# Patient Record
Sex: FEMALE | Race: ASIAN | NOT HISPANIC OR LATINO
[De-identification: names, ages, dates, MRNs, and addresses within clinical notes are randomized per-mention and may not be internally consistent; named-entity substitution may affect disease eponyms.]

---

## 2017-10-18 ENCOUNTER — FORM ENCOUNTER (OUTPATIENT)
Age: 47
End: 2017-10-18

## 2018-10-17 ENCOUNTER — FORM ENCOUNTER (OUTPATIENT)
Age: 48
End: 2018-10-17

## 2018-10-23 ENCOUNTER — FORM ENCOUNTER (OUTPATIENT)
Age: 48
End: 2018-10-23

## 2019-04-09 ENCOUNTER — FORM ENCOUNTER (OUTPATIENT)
Age: 49
End: 2019-04-09

## 2019-10-23 ENCOUNTER — FORM ENCOUNTER (OUTPATIENT)
Age: 49
End: 2019-10-23

## 2019-10-28 ENCOUNTER — FORM ENCOUNTER (OUTPATIENT)
Age: 49
End: 2019-10-28

## 2020-09-30 ENCOUNTER — FORM ENCOUNTER (OUTPATIENT)
Age: 50
End: 2020-09-30

## 2020-12-21 ENCOUNTER — NON-APPOINTMENT (OUTPATIENT)
Age: 50
End: 2020-12-21

## 2020-12-21 PROBLEM — Z00.00 ENCOUNTER FOR PREVENTIVE HEALTH EXAMINATION: Status: ACTIVE | Noted: 2020-12-21

## 2020-12-22 DIAGNOSIS — Z78.9 OTHER SPECIFIED HEALTH STATUS: ICD-10-CM

## 2020-12-22 DIAGNOSIS — Z86.79 PERSONAL HISTORY OF OTHER DISEASES OF THE CIRCULATORY SYSTEM: ICD-10-CM

## 2021-01-04 ENCOUNTER — APPOINTMENT (OUTPATIENT)
Dept: BREAST CENTER | Facility: CLINIC | Age: 51
End: 2021-01-04

## 2021-01-11 ENCOUNTER — FORM ENCOUNTER (OUTPATIENT)
Age: 51
End: 2021-01-11

## 2021-01-12 ENCOUNTER — APPOINTMENT (OUTPATIENT)
Dept: BREAST CENTER | Facility: CLINIC | Age: 51
End: 2021-01-12
Payer: COMMERCIAL

## 2021-01-12 ENCOUNTER — APPOINTMENT (OUTPATIENT)
Dept: BREAST CENTER | Facility: CLINIC | Age: 51
End: 2021-01-12

## 2021-01-12 VITALS
BODY MASS INDEX: 19.84 KG/M2 | HEART RATE: 60 BPM | HEIGHT: 63 IN | SYSTOLIC BLOOD PRESSURE: 135 MMHG | WEIGHT: 112 LBS | DIASTOLIC BLOOD PRESSURE: 82 MMHG

## 2021-01-12 DIAGNOSIS — Z12.39 ENCOUNTER FOR OTHER SCREENING FOR MALIGNANT NEOPLASM OF BREAST: ICD-10-CM

## 2021-01-12 DIAGNOSIS — N63.0 UNSPECIFIED LUMP IN UNSPECIFIED BREAST: ICD-10-CM

## 2021-01-12 PROCEDURE — 99072 ADDL SUPL MATRL&STAF TM PHE: CPT

## 2021-01-12 PROCEDURE — 99213 OFFICE O/P EST LOW 20 MIN: CPT

## 2021-01-12 NOTE — PAST MEDICAL HISTORY
[Menarche Age ____] : age at menarche was [unfilled] [Total Preg ___] : G[unfilled] [Live Births ___] : P[unfilled]  [Age At Live Birth ___] : Age at live birth: [unfilled] [unknown] : the patient is unsure of the date of her LMP

## 2021-01-13 ENCOUNTER — TRANSCRIPTION ENCOUNTER (OUTPATIENT)
Age: 51
End: 2021-01-13

## 2021-01-14 ENCOUNTER — FORM ENCOUNTER (OUTPATIENT)
Age: 51
End: 2021-01-14

## 2021-01-15 NOTE — PHYSICAL EXAM
[Supple] : supple [No Supraclavicular Adenopathy] : no supraclavicular adenopathy [No Cervical Adenopathy] : no cervical adenopathy [Examined in the supine and seated position] : examined in the supine and seated position [No Nipple Retraction] : no left nipple retraction [No Nipple Discharge] : no left nipple discharge [No Axillary Lymphadenopathy] : no left axillary lymphadenopathy [de-identified] : dense breasts [de-identified] : thickening 7:00 with no suspicious findings on in-office US [de-identified] : UOQ nodularity

## 2021-01-15 NOTE — DATA REVIEWED
[FreeTextEntry1] : 10/19/17: Mark MG & US ([facility]): extremely dense, US – multiple stable circumscribed solid nodules c/w fibroadenomas including: R – 0.5cm 12:00 2FN, 0.7cm 12:30 2FN, 0.9cm 1:00 5FN, 0.5cm 7:00 3FN, 0.4cm 8:00 3FN, 0.9cm 9:00 2FN, 0.5cm 10:00 3FN, L – 0.7cm 12:00 3FN, 0.9cm 12:00 2FN, 0.5cm 1:00 4FN, 0.8cm 2:00 2FN, 0.9cm 3:00, 1cm 10:00 2FN, 0.4cm 10:30 1FN. BIRADS 2. \par 10/18/18: [facility] & US ([facility]): R – interval development of a 1cm irregular shaped mass 12:00 5FN assoc. w/ two adjacent coarse calcs confirmed on additional imaging, 0.9cm mass 2cm back from the nipple, L – 0.8cm mass 2:00 1-2cm back from the nipple, 0.5cm mass 12:00; US – stable mark solid masses c/w multiple fibroadenomata, R – 0.9cm solid mass 1:00 5FN c/w suspicious mass on MG – US bx rec. BIRADS 4. MRI rec d/t potential high risk status\par 10/18/18: R FNA: proliferative fibrocystic change\par 10/24/18: MRI ([facility]): heterogeneously dense fibroglandular tissue w/ mild background parenchymal enhancement characterized by few scattered foci of parenchymal enhancement, R – new 1cm enhancing mass w/ angular and indistinct margins UOQ likely c/w irregular mass on MG & US, L – 0.9cm oval enhancing mass w/ circumscribed margins UIQ 3-4FN decreased in size from prior MRI c/w fibroadenoma, 0.7cm oval circumscribed nonenhancing mass 3-4FN. BIRADS 4 US bx rec for R 1cm irregular mass. \par 10/24/19: Mark MG & US ([facility]): extremely dense, R – few stable benign calcs, US – multiple stable benign nodules mark. BIRADS 2.\par 10/29/19: MRI ([facility]): heterogeneously dense fibroglandular tissue w/ marked background parenchymal enhancemen, L – 0.7cm oval enhancing mass w/ circumscribed margins UIQ 4FN, R – no enhancing mass UIQ that c/w finding on prior study s/p benign bx. BIRADS 2.\par 10/1/20: Mark MG & US ([facility]): extremely dense, ANANYA, US – numerous mark benign nodules. BIRADS 2. \par 1/12/21: MR: heterogeneously dense fibroglandular tissue w/ mild background parenchymal enhancements, scattered foci of enhancement mark w/o dominant suspicious focus, L – 0.7cm stable enhancing mass w/ circumscribed borders upper inner quadrant likely fibroadenoma. BIRADS 2. \par

## 2021-01-15 NOTE — HISTORY OF PRESENT ILLNESS
[FreeTextEntry1] : 49yo female previously seen by Dr. De Paz with no personal hx of breast ca and FHx of breast ca mother 58, maternal aunt 56, and paternal great aunt 60 presents for breast cancer screening. Patient denies any palpable masses, skin changes, or nipple discharge bilaterally. Mark MG & US 10/1/20 BIRADS 2. 1/12/21 MR BIRADS 2.

## 2021-01-15 NOTE — ASSESSMENT
[FreeTextEntry1] : This is a 51yo female with FHx breast ca mother 54 and maternal aunt 56 presenting for breast cancer screening. Patient denies palpable masses, skin changes, or nipple discharge bilaterally. Physical examination notes bilateral breast density with nodularity in L UOQ and thickening in R at 7:00. Most recent mammogram and sonogram 10/1/20 BIRADS 2 and MRI from today WNL. Discussed with patient genetic predispostion to cancer and referred her to genetic counselor. She is to return in 6 months for re-examination and is to have screening mammo/sono in October 2021.

## 2021-01-28 ENCOUNTER — NON-APPOINTMENT (OUTPATIENT)
Age: 51
End: 2021-01-28

## 2021-07-13 ENCOUNTER — APPOINTMENT (OUTPATIENT)
Dept: BREAST CENTER | Facility: CLINIC | Age: 51
End: 2021-07-13
Payer: COMMERCIAL

## 2021-07-13 VITALS
SYSTOLIC BLOOD PRESSURE: 131 MMHG | BODY MASS INDEX: 19.84 KG/M2 | WEIGHT: 112 LBS | HEIGHT: 63 IN | HEART RATE: 60 BPM | DIASTOLIC BLOOD PRESSURE: 65 MMHG

## 2021-07-13 DIAGNOSIS — R92.2 INCONCLUSIVE MAMMOGRAM: ICD-10-CM

## 2021-07-13 DIAGNOSIS — N64.9 DISORDER OF BREAST, UNSPECIFIED: ICD-10-CM

## 2021-07-13 DIAGNOSIS — Z12.39 ENCOUNTER FOR OTHER SCREENING FOR MALIGNANT NEOPLASM OF BREAST: ICD-10-CM

## 2021-07-13 PROCEDURE — 99213 OFFICE O/P EST LOW 20 MIN: CPT

## 2021-07-13 PROCEDURE — 99072 ADDL SUPL MATRL&STAF TM PHE: CPT

## 2021-07-13 RX ORDER — SULFAMETHOXAZOLE AND TRIMETHOPRIM 800; 160 MG/1; MG/1
800-160 TABLET ORAL
Qty: 6 | Refills: 0 | Status: DISCONTINUED | COMMUNITY
Start: 2021-01-28

## 2021-07-13 NOTE — PAST MEDICAL HISTORY
[Menarche Age ____] : age at menarche was [unfilled] [Total Preg ___] : G[unfilled] [Live Births ___] : P[unfilled]  [Age At Live Birth ___] : Age at live birth: [unfilled] [Perimenopausal] : The patient is perimenopausal [Definite ___ (Date)] : the last menstrual period was [unfilled] [Irregular Cycle Intervals] : are  irregular

## 2021-07-17 NOTE — ASSESSMENT
[FreeTextEntry1] : 51yo female LOV 1/12/21 previously seen by Dr. De Paz with no personal hx of breast ca and FHx of breast ca mother 58, maternal aunt 56, and paternal great aunt 60 presents for breast cancer screening. Patient denies any palpable masses, skin changes, or nipple discharge bilaterally. Mark MG & US 10/1/20 BIRADS 2. 1/12/21 MR BIRADS 2. Patient due for mammo/sono in October and MRI in April. Patient to return in April after MRI for reexamination.

## 2021-07-17 NOTE — HISTORY OF PRESENT ILLNESS
[FreeTextEntry1] : 51yo female LOV 1/12/21 previously seen by Dr. De Paz with no personal hx of breast ca and FHx of breast ca mother 58, maternal aunt 56, and paternal great aunt 60 presents for breast cancer screening. Patient denies any palpable masses, skin changes, or nipple discharge bilaterally. Mark MG & US 10/1/20 BIRADS 2. 1/12/21 MR BIRADS 2. \par \par Genetic Testing January 2021 negative\par \par DAQUAN Banks Lifetime Risk Score 30.7%

## 2021-07-17 NOTE — PHYSICAL EXAM
[Supple] : supple [No Supraclavicular Adenopathy] : no supraclavicular adenopathy [No Cervical Adenopathy] : no cervical adenopathy [Examined in the supine and seated position] : examined in the supine and seated position [No Nipple Retraction] : no left nipple retraction [No Nipple Discharge] : no left nipple discharge [No Axillary Lymphadenopathy] : no left axillary lymphadenopathy [de-identified] : dense breasts [de-identified] : thickening 7:00  [de-identified] : UOQ nodularity

## 2021-10-12 ENCOUNTER — NON-APPOINTMENT (OUTPATIENT)
Age: 51
End: 2021-10-12

## 2022-04-04 ENCOUNTER — NON-APPOINTMENT (OUTPATIENT)
Age: 52
End: 2022-04-04

## 2022-04-06 ENCOUNTER — NON-APPOINTMENT (OUTPATIENT)
Age: 52
End: 2022-04-06

## 2022-05-03 ENCOUNTER — APPOINTMENT (OUTPATIENT)
Dept: BREAST CENTER | Facility: CLINIC | Age: 52
End: 2022-05-03
Payer: COMMERCIAL

## 2022-05-03 VITALS
HEIGHT: 63 IN | WEIGHT: 111 LBS | DIASTOLIC BLOOD PRESSURE: 74 MMHG | SYSTOLIC BLOOD PRESSURE: 133 MMHG | BODY MASS INDEX: 19.67 KG/M2 | HEART RATE: 53 BPM

## 2022-05-03 PROCEDURE — 99213 OFFICE O/P EST LOW 20 MIN: CPT

## 2022-05-03 RX ORDER — CHROMIUM 200 MCG
TABLET ORAL
Refills: 0 | Status: ACTIVE | COMMUNITY

## 2022-05-07 NOTE — PAST MEDICAL HISTORY
[Menarche Age ____] : age at menarche was [unfilled] [Definite ___ (Date)] : the last menstrual period was [unfilled] [Irregular Cycle Intervals] : are  irregular [Total Preg ___] : G[unfilled] [Live Births ___] : P[unfilled]  [Age At Live Birth ___] : Age at live birth: [unfilled] [Postmenopausal] : The patient is postmenopausal [Menopause Age____] : age at menopause was [unfilled] [History of Hormone Replacement Treatment] : has no history of hormone replacement treatment [FreeTextEntry6] : no [FreeTextEntry7] : no [FreeTextEntry8] : yes

## 2022-05-07 NOTE — ASSESSMENT
[FreeTextEntry1] : Patient is a 51 year female with a strong family history of breast cancer, lifetime DAQUAN risk of 34.3%. Discussed with the patient the implications for her lifetime risk, which is considered to be at high risk to develop breast cancer over the span of her lifetime. Patient undergoes high risk screening surveillance to include biannual radiological screening exams with a mammogram and screening MRI. Mark MG & US 10/1/21 BIRADS 2. 4/5/22 MR BIRADS 2. Reviewed risk reduction options from lifestyle, radiological surveillance, anti-estrogen maintenance, and prophylactic mastectomy. Will refer patient to med onc Dr. Diop to discuss anti-estrogen or aromatase inhibitor options and to plastic surgeon Dr. Lerman to discuss prophylactic mastectomy reconstruction options. Plan for patient to return in 6 weeks. Plan for screening mammogram/US in October 2022. All patient questions were answered; she verbalized understanding of the information and plan of care.\par

## 2022-05-07 NOTE — HISTORY OF PRESENT ILLNESS
[FreeTextEntry1] : 50yo female presents for follow up of high risk screening. Patient with significant family history of breast ca mother 58, maternal aunt 56, and paternal great aunt 60, sister with DCIS x2 age 40s for both. Patient denies any palpable masses, skin changes, or nipple discharge bilaterally. Denies prior breast surgeries. Patient is a former patient of Dr. De Paz and has a history of benign bilateral biopsies. Mark MG & US 10/1/21 BIRADS 2. 4/5/22 MR BIRADS 2. \par \par Genetic Testing January 2021 negative\par \par DAQUAN Banks Lifetime Risk Score 34.3%, now including sister's DCIS diagnosis.

## 2022-05-07 NOTE — PHYSICAL EXAM
[Supple] : supple [No Supraclavicular Adenopathy] : no supraclavicular adenopathy [No Cervical Adenopathy] : no cervical adenopathy [Examined in the supine and seated position] : examined in the supine and seated position [No Nipple Retraction] : no left nipple retraction [No Nipple Discharge] : no left nipple discharge [No Axillary Lymphadenopathy] : no left axillary lymphadenopathy [No dominant masses] : no dominant masses in right breast  [No dominant masses] : no dominant masses left breast [de-identified] : dense breasts

## 2022-05-07 NOTE — DATA REVIEWED
[FreeTextEntry1] : 10/19/17: [facility] & US ([facility]): extremely dense, US – multiple stable circumscribed solid nodules c/w fibroadenomas including: R – 0.5cm 12:00 2FN, 0.7cm 12:30 2FN, 0.9cm 1:00 5FN, 0.5cm 7:00 3FN, 0.4cm 8:00 3FN, 0.9cm 9:00 2FN, 0.5cm 10:00 3FN, L – 0.7cm 12:00 3FN, 0.9cm 12:00 2FN, 0.5cm 1:00 4FN, 0.8cm 2:00 2FN, 0.9cm 3:00, 1cm 10:00 2FN, 0.4cm 10:30 1FN. BIRADS 2. \par 10/18/18: [facility] & US ([facility]): R – interval development of a 1cm irregular shaped mass 12:00 5FN assoc. w/ two adjacent coarse calcs confirmed on additional imaging, 0.9cm mass 2cm back from the nipple, L – 0.8cm mass 2:00 1-2cm back from the nipple, 0.5cm mass 12:00; US – stable mark solid masses c/w multiple fibroadenomata, R – 0.9cm solid mass 1:00 5FN c/w suspicious mass on MG – US bx rec. BIRADS 4. MRI rec d/t potential high risk status\par 10/18/18: R FNA: proliferative fibrocystic change\par 10/24/18: MRI ([facility]): heterogeneously dense fibroglandular tissue w/ mild background parenchymal enhancement characterized by few scattered foci of parenchymal enhancement, R – new 1cm enhancing mass w/ angular and indistinct margins UOQ likely c/w irregular mass on MG & US, L – 0.9cm oval enhancing mass w/ circumscribed margins UIQ 3-4FN decreased in size from prior MRI c/w fibroadenoma, 0.7cm oval circumscribed nonenhancing mass 3-4FN. BIRADS 4 US bx rec for R 1cm irregular mass. \par 10/24/19: Mark MG & US ([facility]): extremely dense, R – few stable benign calcs, US – multiple stable benign nodules mark. BIRADS 2.\par 10/29/19: MRI ([facility]): heterogeneously dense fibroglandular tissue w/ marked background parenchymal enhancemen, L – 0.7cm oval enhancing mass w/ circumscribed margins UIQ 4FN, R – no enhancing mass UIQ that c/w finding on prior study s/p benign bx. BIRADS 2.\par 10/1/20: Mark MG & US ([facility]): extremely dense, ANANYA, US – numerous mark benign nodules. BIRADS 2. \par 1/12/21: MR: heterogeneously dense fibroglandular tissue w/ mild background parenchymal enhancements, scattered foci of enhancement mark w/o dominant suspicious focus, L – 0.7cm stable enhancing mass w/ circumscribed borders upper inner quadrant likely fibroadenoma. BIRADS 2. \par \par 10/4/21 ([facility]) bilateral sMMG/US: extremely dense. There are numerous bilateral benign-appearing cystic and solid nodules, see full report. FOLLOW-UP: Annual screening. BIRADS-2: Benign. \par \par 4/5/2022 ([facility]) bilateral MRI: Again seen is a stable 7 mm enhancing mass in the upper inner left breast and most consistent with benignity. Scattered foci of enhancement are again seen in the right breast without significant change and suggestive of background.No MR evidence of malignancy.  FOLLOW-UP: Annual screening. BI-RADS 2:  Benign.

## 2022-05-17 ENCOUNTER — APPOINTMENT (OUTPATIENT)
Dept: PLASTIC SURGERY | Facility: CLINIC | Age: 52
End: 2022-05-17
Payer: COMMERCIAL

## 2022-05-17 ENCOUNTER — APPOINTMENT (OUTPATIENT)
Dept: HEMATOLOGY ONCOLOGY | Facility: CLINIC | Age: 52
End: 2022-05-17
Payer: COMMERCIAL

## 2022-05-17 VITALS
DIASTOLIC BLOOD PRESSURE: 74 MMHG | BODY MASS INDEX: 19.84 KG/M2 | RESPIRATION RATE: 18 BRPM | TEMPERATURE: 98.5 F | OXYGEN SATURATION: 99 % | WEIGHT: 112 LBS | SYSTOLIC BLOOD PRESSURE: 135 MMHG | HEIGHT: 63 IN | HEART RATE: 69 BPM

## 2022-05-17 DIAGNOSIS — Z78.9 OTHER SPECIFIED HEALTH STATUS: ICD-10-CM

## 2022-05-17 PROCEDURE — 99204 OFFICE O/P NEW MOD 45 MIN: CPT

## 2022-05-17 PROCEDURE — 99203 OFFICE O/P NEW LOW 30 MIN: CPT

## 2022-05-17 NOTE — REASON FOR VISIT
[Initial Consultation] : an initial consultation [Spouse] : spouse [FreeTextEntry2] : high risk of breast cancer

## 2022-05-17 NOTE — PHYSICAL EXAM
[de-identified] : No ptosis, SN-N 20cm, BD 12 cm, syymetric,  no nipple inversion, no nipple discharge, no skin changes  [de-identified] : inadequate tissue for autologous reconstruction  [de-identified] : b/l upper inner thighs with minimal adiposity but has skin laxity. adequate donor site for autologous recon PAP flaps, but will have significantly smaller breasts\par

## 2022-05-17 NOTE — HISTORY OF PRESENT ILLNESS
[de-identified] : 51F with a high risk of breast cancer is referred by Dr. Ellis to discuss chemoprevention. She was told her options are continued surveillance, prophylactic tamoxifen, or prophylactic mastectomy.  She has seen Dr. Lerman earlier today to discuss reconstruction options for prophylactic mastectomy.\par \par Patient with significant family history of breast ca mother 58, maternal aunt 56, and paternal great aunt 60, sister with LCIS x2 age 40s (previously thought it was DCIS, but spoke to her sister a few days ago and confirmed it was LCIS) for both. Patient denies any palpable masses, skin changes, or nipple discharge bilaterally. Denies prior breast surgeries. Patient is a former patient of Dr. De Paz and has a history of benign bilateral biopsies. \par \par Genetic Testing January 2021 negative\par \par 10/19/17: Mark MG & US ([facility]): extremely dense, US – multiple stable circumscribed solid nodules c/w fibroadenomas including: R – 0.5cm 12:00 2FN, 0.7cm 12:30 2FN, 0.9cm 1:00 5FN, 0.5cm 7:00 3FN, 0.4cm 8:00 3FN, 0.9cm 9:00 2FN, 0.5cm 10:00 3FN, L – 0.7cm 12:00 3FN, 0.9cm 12:00 2FN, 0.5cm 1:00 4FN, 0.8cm 2:00 2FN, 0.9cm 3:00, 1cm 10:00 2FN, 0.4cm 10:30 1FN. BIRADS 2. \par \par 10/18/18: Mark MG & US ([facility]): R – interval development of a 1cm irregular shaped mass 12:00 5FN assoc. w/ two adjacent coarse calcs confirmed on additional imaging, 0.9cm mass 2cm back from the nipple, L – 0.8cm mass 2:00 1-2cm back from the nipple, 0.5cm mass 12:00; US – stable mark solid masses c/w multiple fibroadenomata, R – 0.9cm solid mass 1:00 5FN c/w suspicious mass on MG – US bx rec. BIRADS 4. MRI rec d/t potential high risk status\par \par 10/18/18: R FNA: proliferative fibrocystic change\par \par 10/24/18: MRI ([facility]): heterogeneously dense fibroglandular tissue w/ mild background parenchymal enhancement characterized by few scattered foci of parenchymal enhancement, R – new 1cm enhancing mass w/ angular and indistinct margins UOQ likely c/w irregular mass on MG & US, L – 0.9cm oval enhancing mass w/ circumscribed margins UIQ 3-4FN decreased in size from prior MRI c/w fibroadenoma, 0.7cm oval circumscribed nonenhancing mass 3-4FN. BIRADS 4 US bx rec for R 1cm irregular mass. \par \par 10/24/19: Mark MG & US ([facility]): extremely dense, R – few stable benign calcs, US – multiple stable benign nodules mark. BIRADS 2.\par \par 10/29/19: MRI ([facility]): heterogeneously dense fibroglandular tissue w/ marked background parenchymal enhancemen, L – 0.7cm oval enhancing mass w/ circumscribed margins UIQ 4FN, R – no enhancing mass UIQ that c/w finding on prior study s/p benign bx. BIRADS 2.\par \par 10/1/20: Mark MG & US ([facility]): extremely dense, ANANYA, US – numerous mark benign nodules. BIRADS 2. \par \par 1/12/21: MR: heterogeneously dense fibroglandular tissue w/ mild background parenchymal enhancements, scattered foci of enhancement mark w/o dominant suspicious focus, L – 0.7cm stable enhancing mass w/ circumscribed borders upper inner quadrant likely fibroadenoma. BIRADS 2. \par \par 10/4/21 ([facility]) bilateral sMMG/US: extremely dense. There are numerous bilateral benign-appearing cystic and solid nodules.\par \par 4/5/2022 ([facility]) bilateral MRI: Again seen is a stable 7 mm enhancing mass in the upper inner left breast and most consistent with benignity. Scattered foci of enhancement are again seen in the right breast without significant change and suggestive of background.No MR evidence of malignancy. \par \par Denies history of clots.

## 2022-05-17 NOTE — CONSULT LETTER
[Consult Letter:] : I had the pleasure of evaluating your patient, [unfilled]. [Please see my note below.] : Please see my note below. [Consult Closing:] : Thank you very much for allowing me to participate in the care of this patient.  If you have any questions, please do not hesitate to contact me. [Sincerely,] : Sincerely, [FreeTextEntry2] : Hayden Ellis MD [FreeTextEntry3] : Oren Lerman, MD, FACS\par Cosmetic & Reconstructive Plastic Surgery\par Director, Aesthetic and Reconstructive Breast Surgery Fellowship - [facility]\par Associate Professor of Surgery, [facility] at [facility]\par Past President, [place] [employer]\par Tel: [phone]\par Fax: [phone]\par [email or web address]\par

## 2022-05-17 NOTE — ASSESSMENT
[FreeTextEntry1] : I reviewed with Ms. SANDERS in detail the risks, benefits, and alternatives of both implant based breast reconstruction as well as autologous tissue reconstruction. She is very thin and is a better candidate for implant based reconstruction. She has not decided if she wants prophylactic mastectomy at this time. \par \par I reviewed with her in detail the risks, benefits, and alternatives of both implant based breast reconstruction as well as autologous tissue reconstruction. Specifically, I explained to her that an implant reconstruction usually consists of two separate stages with two surgeries spaced about 4 months apart. At the time of the mastectomy, a tissue expander is placed underneath the pectoralis muscle or on op of the pectoralis muscle and is often reinforced with biologic mesh - acellular dermal matrix. We expand the tissue expander secondarily in the office by injecting saline into the implant percutaneously until the desired size breast mound is achieved. At that point a second stage operation is scheduled where we take her back to operating room and remove the tissue expander and place a permanent breast implant.  The permanent prosthesis can be either silicone or saline. The first stage of the reconstruction is approximately 3 hours for one breast and 4-5 hours for a bilateral procedure, with a 1-2 night hospital stay and a four-week recovery.  The second stage surgery is an outpatient procedure with a two-week recovery. I reviewed with her the risks of implant reconstruction including but not limited to bleeding, scarring, implant infection, implant rupture, capsule contracture, implant malposition, asymmetry, contour abnormality, and need for revision surgeries. I also discussed the FDA recommendations for silicone implant rupture screening with MRI. \par \par I also explained that there is a possibility of contour abnormality, asymmetry between the two breasts, and possible need for revision surgery. A surgery on the native contralateral breast to achieve symmetry in the setting of a unilateral mastectomy is usually required and often performed at the time of the implant exchange or nipple reconstruction. The nipple areolar reconstruction is performed at a later date as a separate procedure.\par \par I then reviewed with her the details of autologous tissue reconstruction, specifically using a free flap from her buttocks or upper posterior thigh / inferior gluteal crease region. I reviewed the risks, benefits, and alternatives of a profunda artery  (PAP) flap reconstruction (Or GAP flap)  Specifically, I explained to her that we transplant the skin, the fat, and the blood vessels from the buttocks or posterior upper thigh to the chest wall where we reconnect the artery and the vein using microvascular surgical techniques.This operation is approximately six hours and nine hours for both sides. There is a three day hospitalization and a six-week recovery period.  The risks include of free flap failure, vascular compromise requiring return to the operating room, donor site scarring and potential delayed wound healing, wound dehiscence, suboptimal scarring or asymmetry associated with the donor site.  There is also a possibility of contour abnormality and asymmetry between the two breasts. Finally, nipple areola reconstruction is performed at a later date as a separate procedure. If there is a free flap loss we would likely place a tissue expander at the time returning to the operating room in order to preserve the breast skin envelope and perform a delayed reconstruction.\par \par

## 2022-05-17 NOTE — HISTORY OF PRESENT ILLNESS
[FreeTextEntry1] : 50 y/o female referred by Dr. Ellis presents for initial consultation for possible breast reconstruction at the time of prophylactic mastectomy. she has strong family history of breast ca mother diagnose at age 58, maternal aunt diagnosed at age 56, and paternal great aunt diagnosed at age 60, sister diagnosed with DCIS x2 age 40s for both. No family history of ovarian cancer. Patient genetic tested in 01/2021, results was negative. She gets surveillance every 6 months due to high risk. \par \par She is deciding between continued surveillance vs tamoxifen, vs prophylactic mastectomy for risk reduction. \par \par No personal or family history of bleeding or clotting disorder. VTE Score: 3

## 2022-06-07 ENCOUNTER — APPOINTMENT (OUTPATIENT)
Dept: BREAST CENTER | Facility: CLINIC | Age: 52
End: 2022-06-07
Payer: COMMERCIAL

## 2022-06-07 VITALS
HEART RATE: 56 BPM | HEIGHT: 63 IN | WEIGHT: 110.5 LBS | BODY MASS INDEX: 19.58 KG/M2 | DIASTOLIC BLOOD PRESSURE: 74 MMHG | SYSTOLIC BLOOD PRESSURE: 116 MMHG

## 2022-06-07 PROCEDURE — 99213 OFFICE O/P EST LOW 20 MIN: CPT

## 2022-06-10 NOTE — HISTORY OF PRESENT ILLNESS
[FreeTextEntry1] : 50 yo female, accompanied by  presents for 6 week follow up to discuss management of her high risk status. Patient is deciding between continued surveillance, medical treatment or surgical treatment. Patient has significant family history of breast ca mother 58, maternal aunt 56, and paternal great aunt 60, sister with LCIS x2 age 30s then 40s. Of note, patient with another sister in the [facility], age 54, pending biopsy now for new breast lump. Patient denies any palpable masses, skin changes, or nipple discharge bilaterally. Denies prior breast surgeries. Patient is a former patient of Dr. De Paz and has a history of benign bilateral FNA's Mark MG & US 10/1/21 BIRADS 2. 4/5/22 MR BIRADS 2. \par \par Genetic Testing January 2021 negative.\par \par Since patient's last visit, she has established care with Dr. Lerman plastic surgery to discuss reconstruction options and with Dr. Diop medical oncology to discuss medical management risk reduction with tamoxifen, patient is considering. \par \par DAQUAN Banks Lifetime Risk Score recalculated today 30.4%. At last visit, patient had reported sister with history of DCIS x2, corrected today to be LCIS x2. \par \par

## 2022-06-10 NOTE — PHYSICAL EXAM
[Supple] : supple [No Supraclavicular Adenopathy] : no supraclavicular adenopathy [No Cervical Adenopathy] : no cervical adenopathy [Examined in the supine and seated position] : examined in the supine and seated position [No dominant masses] : no dominant masses in right breast  [No dominant masses] : no dominant masses left breast [No Nipple Retraction] : no left nipple retraction [No Nipple Discharge] : no left nipple discharge [No Axillary Lymphadenopathy] : no left axillary lymphadenopathy [de-identified] : dense breasts

## 2022-06-10 NOTE — ASSESSMENT
[FreeTextEntry1] : Patient is a 51 year female with a strong family history of breast cancer, lifetime DAQUAN risk of 30.4%. Discussed with the patient the implications for her lifetime risk, which is considered to be at high risk to develop breast cancer over the span of her lifetime. Patient undergoes high risk screening surveillance to include biannual radiological screening exams with a mammogram and screening MRI. Mark MG & US 10/1/21 BIRADS 2. 4/5/22 MR BIRADS 2. Genetic Testing January 2021 negative.\par \par Since patient's last visit, she has established care with Dr. Lerman plastic surgery to discuss reconstruction options for prophylactic mastectomy and with Dr. Diop medical oncology to discuss medical management risk reduction with tamoxifen. Discussed patient's risk reduction options in more detail today and all patient and patient's 's questions were answered. Patient reports she would like to see how her sister's biopsy turns out and would like to take some time to consider all of her options.\par \par Plan for screening mammogram/US and re-examination in October 2022. Patient verbalizes understanding and is in agreement with the plan.

## 2022-06-10 NOTE — PAST MEDICAL HISTORY
[Postmenopausal] : The patient is postmenopausal [Menarche Age ____] : age at menarche was [unfilled] [Menopause Age____] : age at menopause was [unfilled] [Definite ___ (Date)] : the last menstrual period was [unfilled] [Irregular Cycle Intervals] : are  irregular [Total Preg ___] : G[unfilled] [Live Births ___] : P[unfilled]  [Age At Live Birth ___] : Age at live birth: [unfilled] [History of Hormone Replacement Treatment] : has no history of hormone replacement treatment [FreeTextEntry5] : no [FreeTextEntry6] : no [FreeTextEntry7] : no [FreeTextEntry8] : yes

## 2022-07-01 ENCOUNTER — NON-APPOINTMENT (OUTPATIENT)
Age: 52
End: 2022-07-01

## 2022-10-18 ENCOUNTER — APPOINTMENT (OUTPATIENT)
Dept: BREAST CENTER | Facility: CLINIC | Age: 52
End: 2022-10-18

## 2022-10-18 VITALS
SYSTOLIC BLOOD PRESSURE: 132 MMHG | HEIGHT: 63 IN | HEART RATE: 58 BPM | WEIGHT: 113 LBS | BODY MASS INDEX: 20.02 KG/M2 | DIASTOLIC BLOOD PRESSURE: 81 MMHG

## 2022-10-18 DIAGNOSIS — Z80.3 FAMILY HISTORY OF MALIGNANT NEOPLASM OF BREAST: ICD-10-CM

## 2022-10-18 PROCEDURE — 99213 OFFICE O/P EST LOW 20 MIN: CPT

## 2022-10-23 NOTE — HISTORY OF PRESENT ILLNESS
[FreeTextEntry1] : 50 yo female, accompanied by  presents for 4 month follow up to discuss management of her high risk status. Patient is deciding between continued surveillance, medical treatment or surgical treatment. \par \par Patient has significant family history of breast ca mother 58, maternal aunt 56, and paternal great aunt 60, sister with LCIS x2 age 30s then 40s. Of note, patient with another sister in the [facility], age 54, recently diagnosed with breast cancer, genetics unknown. Genetic Testing January 2021 negative.\par \par Patient denies any palpable masses, skin changes, or nipple discharge bilaterally. Denies prior breast surgeries. Patient is a former patient of Dr. De Paz and has a history of benign bilateral FNA's. B/L MRI 4/5/22 BIRADS 2. Most recent imaging, B/L sMMG/US 10/5/22 BI-RADS 2. \par \par Since patient's last visit, she has established care with Dr. Lerman plastic surgery to discuss reconstruction options and with Dr. Diop medical oncology to discuss medical management risk reduction with tamoxifen, patient is considering. At LOV 6/7/22 patient reported she would like to see how her sister's biopsy turns out and would like to take some time to consider all of her options, sister since diagnosed with cancer, genetic testing pending.   \par \par DAQUAN Banks Lifetime Risk Score 30.4%.

## 2022-10-23 NOTE — PAST MEDICAL HISTORY
[Perimenopausal] : The patient is perimenopausal [History of Hormone Replacement Treatment] : has no history of hormone replacement treatment [FreeTextEntry5] : no [FreeTextEntry6] : no [FreeTextEntry7] : no [FreeTextEntry8] : yes

## 2022-10-23 NOTE — ASSESSMENT
[FreeTextEntry1] : Patient is a 51 year female with a strong family history of breast cancer, lifetime DAQUAN risk of 30.4%. Discussed with the patient the implications for her lifetime risk, which is considered to be at high risk to develop breast cancer over the span of her lifetime. Patient undergoes high risk screening surveillance to include biannual radiological screening exams with a mammogram and screening MRI. Genetic Testing January 2021 negative.\par \par Patient is deciding between continued surveillance, medical treatment or surgical treatment. Since patient's last visit, she has established care with Dr. Lerman plastic surgery to discuss reconstruction options and with Dr. Diop medical oncology to discuss medical management risk reduction with tamoxifen, patient is still considering. Patient's sister's biopsy came back as breast cancer since last office visit, her genetic testing is pending. \par \par Patient without complaint. Physical exam WNL. B/L MRI 4/5/22 BIRADS 2. Most recent imaging reviewed, B/L sMMG/US 10/5/22 BI-RADS 2. For now, patient would like to continue with close surveillance. Plan for B/L high risk MRI and re-examination in April 2023. Patient verbalizes understanding and is in agreement with the plan. \par

## 2023-04-11 ENCOUNTER — APPOINTMENT (OUTPATIENT)
Dept: BREAST CENTER | Facility: CLINIC | Age: 53
End: 2023-04-11
Payer: COMMERCIAL

## 2023-04-11 VITALS
SYSTOLIC BLOOD PRESSURE: 143 MMHG | DIASTOLIC BLOOD PRESSURE: 76 MMHG | HEIGHT: 63 IN | BODY MASS INDEX: 20.46 KG/M2 | HEART RATE: 65 BPM | WEIGHT: 115.5 LBS

## 2023-04-11 PROCEDURE — 99215 OFFICE O/P EST HI 40 MIN: CPT

## 2023-04-11 PROCEDURE — 93702 BIS XTRACELL FLUID ANALYSIS: CPT | Mod: NC

## 2023-04-17 NOTE — PHYSICAL EXAM
[Supple] : supple [No Supraclavicular Adenopathy] : no supraclavicular adenopathy [No Cervical Adenopathy] : no cervical adenopathy [Examined in the supine and seated position] : examined in the supine and seated position [No dominant masses] : no dominant masses in right breast  [No dominant masses] : no dominant masses left breast [No Nipple Retraction] : no left nipple retraction [No Nipple Discharge] : no left nipple discharge [No Axillary Lymphadenopathy] : no left axillary lymphadenopathy [Symmetrical] : symmetrical [de-identified] : dense breasts

## 2023-04-17 NOTE — DATA REVIEWED
[FreeTextEntry1] : 10/19/17: Mark MG & US ([facility]): extremely dense, US – multiple stable circumscribed solid nodules c/w fibroadenomas including: R – 0.5cm 12:00 2FN, 0.7cm 12:30 2FN, 0.9cm 1:00 5FN, 0.5cm 7:00 3FN, 0.4cm 8:00 3FN, 0.9cm 9:00 2FN, 0.5cm 10:00 3FN, L – 0.7cm 12:00 3FN, 0.9cm 12:00 2FN, 0.5cm 1:00 4FN, 0.8cm 2:00 2FN, 0.9cm 3:00, 1cm 10:00 2FN, 0.4cm 10:30 1FN. BIRADS 2. \par 10/18/18: Mark MG & US ([facility]): R – interval development of a 1cm irregular shaped mass 12:00 5FN assoc. w/ two adjacent coarse calcs confirmed on additional imaging, 0.9cm mass 2cm back from the nipple, L – 0.8cm mass 2:00 1-2cm back from the nipple, 0.5cm mass 12:00; US – stable mark solid masses c/w multiple fibroadenomata, R – 0.9cm solid mass 1:00 5FN c/w suspicious mass on MG – US bx rec. BIRADS 4. MRI rec d/t potential high risk status\par 10/18/18: R FNA: proliferative fibrocystic change\par 10/24/18: MRI ([facility]): heterogeneously dense fibroglandular tissue w/ mild background parenchymal enhancement characterized by few scattered foci of parenchymal enhancement, R – new 1cm enhancing mass w/ angular and indistinct margins UOQ likely c/w irregular mass on MG & US, L – 0.9cm oval enhancing mass w/ circumscribed margins UIQ 3-4FN decreased in size from prior MRI c/w fibroadenoma, 0.7cm oval circumscribed nonenhancing mass 3-4FN. BIRADS 4 US bx rec for R 1cm irregular mass. \par 10/24/19: Mark MG & US ([facility]): extremely dense, R – few stable benign calcs, US – multiple stable benign nodules mark. BIRADS 2.\par 10/29/19: MRI ([facility]): heterogeneously dense fibroglandular tissue w/ marked background parenchymal enhancemen, L – 0.7cm oval enhancing mass w/ circumscribed margins UIQ 4FN, R – no enhancing mass UIQ that c/w finding on prior study s/p benign bx. BIRADS 2.\par 10/1/20: Mark MG & US ([facility]): extremely dense, ANANYA, US – numerous mark benign nodules. BIRADS 2. \par 1/12/21: MR: heterogeneously dense fibroglandular tissue w/ mild background parenchymal enhancements, scattered foci of enhancement mark w/o dominant suspicious focus, L – 0.7cm stable enhancing mass w/ circumscribed borders upper inner quadrant likely fibroadenoma. BIRADS 2. \par \par 10/4/21 ([facility]) bilateral sMMG/US: extremely dense. There are numerous bilateral benign-appearing cystic and solid nodules, see full report. FOLLOW-UP: Annual screening. BIRADS-2: Benign. \par \par 4/5/2022 ([facility]) bilateral MRI: Again seen is a stable 7 mm enhancing mass in the upper inner left breast and most consistent with benignity. Scattered foci of enhancement are again seen in the right breast without significant change and suggestive of background.No MR evidence of malignancy.  FOLLOW-UP: Annual screening. BI-RADS 2:  Benign. \par \par 10/5/22 ([facility]) B/L sMMG/US: heterogeneously dense. Multiple bilateral benign cysts and stable oval hypoechoic masses, see full report. Benign findings. No mammographic or US evidence of malignancy. FOLLOW-UP: annual screening. BI-RADS 2:  Benign. \par \par 4/11/23 (R) B/L MRI: No MR evidence of malignancy. No interval change. Patient would be due for her annual screening mammogram in October 2023. BIRADS 2.

## 2023-04-17 NOTE — ASSESSMENT
[FreeTextEntry1] : Patient is a 52 year female with a strong family history of breast cancer, lifetime DAQUAN risk of 30.4%. Discussed with the patient the implications for her lifetime risk, which is considered to be at high risk to develop breast cancer over the span of her lifetime. Patient undergoes high risk screening surveillance to include biannual radiological screening exams with a mammogram and screening MRI. Genetic Testing January 2021 negative.\par \par Patient is deciding between continued surveillance, medical treatment or surgical treatment. Since patient's last visit, she has established care with Dr. Lerman plastic surgery to discuss reconstruction options and with Dr. Diop medical oncology to discuss medical management risk reduction with tamoxifen, patient is still considering. Patient's sister's biopsy came back as breast cancer since last office visit, genetic testing negative.\par \par Patient without complaint. Physical exam WNL. B/L sMMG/US 10/5/22 BI-RADS 2. Most recent imaging reviewed, B/L MRI 4/11/23 BIRADS-2. Patient would like to proceed with bilateral nipple sparing mastectomy with bilateral magtrace injection and reconstruction at the time of her surgery. Surgical consent obtained, surgical coordinator aware. Patient will have PCP and cardiology clearance given her history of asymptomatic bradycardia. Patient verbalized understanding and agreement of plan.\par

## 2023-04-17 NOTE — HISTORY OF PRESENT ILLNESS
[FreeTextEntry1] : 53 yo female, accompanied by  presents for 6 month follow up to discuss management of her high risk status. Patient is deciding between continued surveillance, medical treatment or surgical treatment. At LOV 10/18/22, patient stated she wants to proceed with surgical management today.\par \par Patient has significant family history of breast ca mother 58, maternal aunt 56, and paternal great aunt 60, sister with LCIS x2 age 30s then 40s. Of note, patient with another sister in the [facility], age 54, recently diagnosed with breast cancer, genetics unknown. Genetic Testing January 2021 negative.\par \par Patient denies any palpable masses, skin changes, or nipple discharge bilaterally. Denies prior breast surgeries. Patient is a former patient of Dr. De Paz and has a history of benign bilateral FNA's. \par B/L sMMG/US 10/5/22 BI-RADS 2. \par Most recent imaging, B/L MRI performed today, BIRADS 2.\par \par Patient has established care with Dr. Lerman plastic surgery to discuss reconstruction options and with Dr. Diop medical oncology to discuss medical management risk reduction with tamoxifen.\par \par Patient states she has history of asymptomatic bradycardia. No PMHx of HTN, DM, blood clots, COPD, issues with anesthesia. Patient denies any known drug allergies.\par \par DAQUAN Banks Lifetime Risk Score 30.4%.

## 2023-04-17 NOTE — PAST MEDICAL HISTORY
[Menarche Age ____] : age at menarche was [unfilled] [Definite ___ (Date)] : the last menstrual period was [unfilled] [Irregular Cycle Intervals] : are  irregular [Total Preg ___] : G[unfilled] [Live Births ___] : P[unfilled]  [Age At Live Birth ___] : Age at live birth: [unfilled] [Menopause Age____] : age at menopause was [unfilled] [History of Hormone Replacement Treatment] : has no history of hormone replacement treatment [FreeTextEntry5] : no [FreeTextEntry6] : no [FreeTextEntry7] : no [FreeTextEntry8] : yes

## 2023-04-25 ENCOUNTER — APPOINTMENT (OUTPATIENT)
Dept: PLASTIC SURGERY | Facility: CLINIC | Age: 53
End: 2023-04-25
Payer: COMMERCIAL

## 2023-04-25 PROCEDURE — 99214 OFFICE O/P EST MOD 30 MIN: CPT

## 2023-04-25 NOTE — PHYSICAL EXAM
[de-identified] : No ptosis, SN-N 20cm, BD 12 cm, syymetric,  no nipple inversion, no nipple discharge, no skin changes  [de-identified] : inadequate tissue for autologous reconstruction  [de-identified] : b/l upper inner thighs with minimal adiposity but has skin laxity. adequate donor site for autologous recon PAP flaps, but will have significantly smaller breasts\par

## 2023-04-25 NOTE — HISTORY OF PRESENT ILLNESS
[FreeTextEntry1] : 53 y/o female referred by Dr. Ellis presents for follow up to discuss breast reconstruction at the time of prophylactic mastectomy. she has strong family history of breast ca mother diagnose at age 58, maternal aunt diagnosed at age 56, and paternal great aunt diagnosed at age 60, sister diagnosed with DCIS x2 age 40s for both. No family history of ovarian cancer. Patient had genetic testing in 01/2021, results negative. She gets surveillance every 6 months due to high risk. Most recent imaging -- MRI in April 2023 which was clear. BIRADS 2

## 2023-04-25 NOTE — ASSESSMENT
[FreeTextEntry1] : I reviewed with Ms. SANDERS in detail the risks, benefits, and alternatives of both implant based breast reconstruction as well as autologous tissue reconstruction. She is very thin and is a better candidate for implant based reconstruction. She would like to move forward with prophylactic mastectomy with implant based reconstruction at this time. She stated that I answered all of her questions and wants to proceed with 2 stage prepec prosthetic recon with Jessica and ADM at the time of nipple sparing mastectomy. We will coordinate with Dr. Ellis. \par \par

## 2023-06-15 ENCOUNTER — NON-APPOINTMENT (OUTPATIENT)
Age: 53
End: 2023-06-15

## 2023-06-16 ENCOUNTER — APPOINTMENT (OUTPATIENT)
Dept: INTERNAL MEDICINE | Facility: CLINIC | Age: 53
End: 2023-06-16
Payer: COMMERCIAL

## 2023-06-16 ENCOUNTER — APPOINTMENT (OUTPATIENT)
Dept: HEART AND VASCULAR | Facility: CLINIC | Age: 53
End: 2023-06-16
Payer: COMMERCIAL

## 2023-06-16 ENCOUNTER — NON-APPOINTMENT (OUTPATIENT)
Age: 53
End: 2023-06-16

## 2023-06-16 VITALS
HEIGHT: 63 IN | OXYGEN SATURATION: 98 % | WEIGHT: 115 LBS | HEART RATE: 78 BPM | BODY MASS INDEX: 20.38 KG/M2 | TEMPERATURE: 96.6 F | DIASTOLIC BLOOD PRESSURE: 79 MMHG | SYSTOLIC BLOOD PRESSURE: 132 MMHG

## 2023-06-16 VITALS
SYSTOLIC BLOOD PRESSURE: 128 MMHG | BODY MASS INDEX: 20.38 KG/M2 | HEIGHT: 63 IN | TEMPERATURE: 98.2 F | OXYGEN SATURATION: 100 % | WEIGHT: 115 LBS | DIASTOLIC BLOOD PRESSURE: 80 MMHG | HEART RATE: 67 BPM

## 2023-06-16 DIAGNOSIS — Z01.818 ENCOUNTER FOR OTHER PREPROCEDURAL EXAMINATION: ICD-10-CM

## 2023-06-16 DIAGNOSIS — R73.03 PREDIABETES.: ICD-10-CM

## 2023-06-16 DIAGNOSIS — R94.31 ABNORMAL ELECTROCARDIOGRAM [ECG] [EKG]: ICD-10-CM

## 2023-06-16 DIAGNOSIS — Z01.810 ENCOUNTER FOR PREPROCEDURAL CARDIOVASCULAR EXAMINATION: ICD-10-CM

## 2023-06-16 PROCEDURE — ZZZZZ: CPT

## 2023-06-16 PROCEDURE — 93000 ELECTROCARDIOGRAM COMPLETE: CPT

## 2023-06-16 PROCEDURE — 99204 OFFICE O/P NEW MOD 45 MIN: CPT | Mod: 25

## 2023-06-16 PROCEDURE — 93000 ELECTROCARDIOGRAM COMPLETE: CPT | Mod: NC

## 2023-06-16 PROCEDURE — 99203 OFFICE O/P NEW LOW 30 MIN: CPT | Mod: 25

## 2023-06-16 PROCEDURE — 93306 TTE W/DOPPLER COMPLETE: CPT

## 2023-06-16 PROCEDURE — 36415 COLL VENOUS BLD VENIPUNCTURE: CPT

## 2023-06-16 NOTE — DISCUSSION/SUMMARY
[FreeTextEntry1] : stable exam\par structurally normal heart with mild AI\par f/u Holter\par no cardiac contra indication to planned procedure\par  [EKG obtained to assist in diagnosis and management of assessed problem(s)] : EKG obtained to assist in diagnosis and management of assessed problem(s)

## 2023-06-16 NOTE — HISTORY OF PRESENT ILLNESS
[FreeTextEntry1] : for cardiac evaluation prior to breast surgery\par good functional capacity\par no cardiac c/o

## 2023-06-19 LAB
ANION GAP SERPL CALC-SCNC: 12 MMOL/L
BUN SERPL-MCNC: 15 MG/DL
CALCIUM SERPL-MCNC: 9.4 MG/DL
CHLORIDE SERPL-SCNC: 107 MMOL/L
CO2 SERPL-SCNC: 24 MMOL/L
CREAT SERPL-MCNC: 0.75 MG/DL
EGFR: 96 ML/MIN/1.73M2
GLUCOSE SERPL-MCNC: 102 MG/DL
POTASSIUM SERPL-SCNC: 4.2 MMOL/L
SODIUM SERPL-SCNC: 143 MMOL/L

## 2023-06-19 NOTE — ASSESSMENT
[High Risk Surgery - Intraperitoneal, Intrathoracic or Supringuinal Vascular Procedures] : High Risk Surgery - Intraperitoneal, Intrathoracic or Supringuinal Vascular Procedures - No (0) [Ischemic Heart Disease] : Ischemic Heart Disease - No (0) [Congestive Heart Failure] : Congestive Heart Failure - No (0) [Prior Cerebrovascular Accident or TIA] : Prior Cerebrovascular Accident or TIA - No (0) [Creatinine >= 2mg/dL (1 Point)] : Creatinine >= 2mg/dL - No (0) [Insulin-dependent Diabetic (1 Point)] : Insulin-dependent Diabetic - No (0) [0] : 0 , RCRI Class: I, Risk of Post-Op Cardiac Complications: 3.9%, 95% CI for Risk Estimate: 2.8% - 5.4% [Patient Optimized for Surgery] : Patient optimized for surgery [FreeTextEntry4] : Ms. CONCHITA SANDERS is a 52 year old female with history of prediabetes and reactive arthritis presenting today to the [facility] prior to prophylactic mastectomy. She is medically optimized and considered low risk for low risk procedure.

## 2023-06-19 NOTE — HISTORY OF PRESENT ILLNESS
[No Pertinent Cardiac History] : no history of aortic stenosis, atrial fibrillation, coronary artery disease, recent myocardial infarction, or implantable device/pacemaker [No Pertinent Pulmonary History] : no history of asthma, COPD, sleep apnea, or smoking [No Adverse Anesthesia Reaction] : no adverse anesthesia reaction in self or family member [(Patient denies any chest pain, claudication, dyspnea on exertion, orthopnea, palpitations or syncope)] : Patient denies any chest pain, claudication, dyspnea on exertion, orthopnea, palpitations or syncope [Moderate (4-6 METs)] : Moderate (4-6 METs) [Chronic Anticoagulation] : no chronic anticoagulation [Chronic Kidney Disease] : no chronic kidney disease [Diabetes] : no diabetes [FreeTextEntry1] : b/l mastectomy [FreeTextEntry2] : 7/14/23 [FreeTextEntry3] : Dr. Ellis [FreeTextEntry4] : Ms. CONCHITA SANDERS is a 52 year old female with history of prediabetes and reactive arthritis presenting today to the [facility] prior to prophylactic mastectomy. She has a strong family history of breast cancer. She reports good overall health and is a nurse at [employer]. She has cardiology visit with Dr. Sung scheduled for later today due to asymptomatic sinus bradycardia.  [FreeTextEntry8] : walking 8-10 miles a day on the treadmill

## 2023-07-06 ENCOUNTER — APPOINTMENT (OUTPATIENT)
Dept: PLASTIC SURGERY | Facility: CLINIC | Age: 53
End: 2023-07-06
Payer: COMMERCIAL

## 2023-07-06 PROCEDURE — 99213 OFFICE O/P EST LOW 20 MIN: CPT | Mod: 95

## 2023-07-06 NOTE — HISTORY OF PRESENT ILLNESS
[Home] : at home, [unfilled] , at the time of the visit. [Medical Office: ([facility])___] : at the medical office located in  [Verbal consent obtained from patient] : the patient, [unfilled] [FreeTextEntry1] : Patient Name: CONCHITA SANDERS \par : 1970 \par Date: 2023 \par Attending: Dr. Oren Lerman\par \par HPI: preop consultation for bilateral breast reconstruction with tissue expanders on 23.\par \par Allergies: NKDA\par Medication prescribed: oxycodone 5 mg, valium 5 mg \par \par ROS: complete 14 point review of systems negative except pertinent items reviewed in the HPI. Other non-contributory items reviewed in our new patient questionnaire and we have submitted it to be scanned into the medical record. \par \par Physical Exam: \par completed at time of in office evaluation \par \par Assessment/Plan:\par We have discussed pre and postop instructions, recovery limitations, restrictions and expectations, ERAS protocol, NPO status, transportation home, postop medications, ROBERTO drains, benefits and risks of the procedure. Pre-op labs reviewed. The patient would like to proceed with surgery as scheduled.\par \par UYE LERNER NP\par \par

## 2023-07-13 RX ORDER — ERGOCALCIFEROL 1.25 MG/1
0 CAPSULE ORAL
Refills: 0 | DISCHARGE

## 2023-07-14 ENCOUNTER — OUTPATIENT (OUTPATIENT)
Dept: OUTPATIENT SERVICES | Facility: HOSPITAL | Age: 53
LOS: 1 days | Discharge: HOME | End: 2023-07-14
Payer: COMMERCIAL

## 2023-07-14 ENCOUNTER — RESULT REVIEW (OUTPATIENT)
Age: 53
End: 2023-07-14

## 2023-07-14 ENCOUNTER — TRANSCRIPTION ENCOUNTER (OUTPATIENT)
Age: 53
End: 2023-07-14

## 2023-07-14 ENCOUNTER — APPOINTMENT (OUTPATIENT)
Dept: BREAST CENTER | Facility: AMBULATORY SURGERY CENTER | Age: 53
End: 2023-07-14
Payer: COMMERCIAL

## 2023-07-14 VITALS
SYSTOLIC BLOOD PRESSURE: 110 MMHG | DIASTOLIC BLOOD PRESSURE: 60 MMHG | HEART RATE: 67 BPM | OXYGEN SATURATION: 99 % | TEMPERATURE: 97 F | RESPIRATION RATE: 16 BRPM

## 2023-07-14 VITALS
SYSTOLIC BLOOD PRESSURE: 143 MMHG | WEIGHT: 113.1 LBS | OXYGEN SATURATION: 100 % | RESPIRATION RATE: 12 BRPM | DIASTOLIC BLOOD PRESSURE: 62 MMHG | TEMPERATURE: 98 F | HEART RATE: 57 BPM | HEIGHT: 63 IN

## 2023-07-14 DIAGNOSIS — M67.80 OTHER SPECIFIED DISORDERS OF SYNOVIUM AND TENDON, UNSPECIFIED SITE: Chronic | ICD-10-CM

## 2023-07-14 PROCEDURE — 88307 TISSUE EXAM BY PATHOLOGIST: CPT | Mod: 26

## 2023-07-14 PROCEDURE — 88360 TUMOR IMMUNOHISTOCHEM/MANUAL: CPT | Mod: 26

## 2023-07-14 PROCEDURE — 19303 MAST SIMPLE COMPLETE: CPT | Mod: 50,22

## 2023-07-14 PROCEDURE — 19357 TISS XPNDR PLMT BRST RCNSTJ: CPT | Mod: 50

## 2023-07-14 PROCEDURE — 76098 X-RAY EXAM SURGICAL SPECIMEN: CPT | Mod: 26

## 2023-07-14 PROCEDURE — 64912 NRV RPR W/NRV ALGRFT 1ST: CPT | Mod: LT

## 2023-07-14 PROCEDURE — ZZZZZ: CPT

## 2023-07-14 PROCEDURE — 38790 INJECT FOR LYMPHATIC X-RAY: CPT | Mod: 50

## 2023-07-14 PROCEDURE — 15777 ACELLULAR DERM MATRIX IMPLT: CPT | Mod: 50

## 2023-07-14 DEVICE — VISTASEAL FIBRIN HUMAN 10ML: Type: IMPLANTABLE DEVICE | Status: FUNCTIONAL

## 2023-07-14 DEVICE — GRAFT NERVE CONNECTOR 3X15MM: Type: IMPLANTABLE DEVICE | Status: FUNCTIONAL

## 2023-07-14 DEVICE — XPND TISSUE BREAST SMOOTH MOD VAR PROJ 300CC: Type: IMPLANTABLE DEVICE | Status: FUNCTIONAL

## 2023-07-14 RX ORDER — OXYCODONE HYDROCHLORIDE 5 MG/1
5 TABLET ORAL ONCE
Refills: 0 | Status: DISCONTINUED | OUTPATIENT
Start: 2023-07-14 | End: 2023-07-14

## 2023-07-14 RX ORDER — SODIUM CHLORIDE 9 MG/ML
1000 INJECTION, SOLUTION INTRAVENOUS
Refills: 0 | Status: DISCONTINUED | OUTPATIENT
Start: 2023-07-14 | End: 2023-07-14

## 2023-07-14 RX ORDER — FENTANYL CITRATE 50 UG/ML
50 INJECTION INTRAVENOUS
Refills: 0 | Status: DISCONTINUED | OUTPATIENT
Start: 2023-07-14 | End: 2023-07-14

## 2023-07-14 RX ORDER — FENTANYL CITRATE 50 UG/ML
25 INJECTION INTRAVENOUS
Refills: 0 | Status: DISCONTINUED | OUTPATIENT
Start: 2023-07-14 | End: 2023-07-14

## 2023-07-14 RX ORDER — BENZOCAINE AND MENTHOL 5; 1 G/100ML; G/100ML
1 LIQUID ORAL ONCE
Refills: 0 | Status: COMPLETED | OUTPATIENT
Start: 2023-07-14 | End: 2023-07-14

## 2023-07-14 RX ORDER — ONDANSETRON 8 MG/1
4 TABLET, FILM COATED ORAL ONCE
Refills: 0 | Status: DISCONTINUED | OUTPATIENT
Start: 2023-07-14 | End: 2023-07-14

## 2023-07-14 RX ADMIN — BENZOCAINE AND MENTHOL 1 LOZENGE: 5; 1 LIQUID ORAL at 16:56

## 2023-07-14 RX ADMIN — OXYCODONE HYDROCHLORIDE 5 MILLIGRAM(S): 5 TABLET ORAL at 16:25

## 2023-07-14 NOTE — BRIEF OPERATIVE NOTE - NSICDXBRIEFPROCEDURE_GEN_ALL_CORE_FT
PROCEDURES:  Mastectomy, bilateral, with breast reconstruction 14-Jul-2023 14:54:11  Aneesh Gupta  
PROCEDURES:  Mastectomy, bilateral, nipple sparing 14-Jul-2023 10:23:15  Lisbet Min

## 2023-07-14 NOTE — BRIEF OPERATIVE NOTE - OPERATION/FINDINGS
expected
Patient undergoing prophylactic. bilateral mastectomy for extensive family history of breast cancer.   Flaps raised superiorly to clavicle, medially to sternum, inferiorly to IMF, laterally to border of latissimus dorsi, and posteriorly to pec major fascia using electrocautery and sharp dissection. Specimens weighed and margins were labeled with silk suture. Care was taken not to injure the skin lying in the superior part of the graft. Bleeding vessels successfully identified and controlled.    Incisions were left open for placement of the tissue expanders by the Plastic Surgery team.

## 2023-07-14 NOTE — ASU PREOP CHECKLIST - BSA (M2)
When ED tech entered room to start IV, patient requested that tech look on the side of his arm for IV access, ED tech explained that the patient had veins in the arm she was currently looking at. Patient became increasingly agitated and took of tourniquet saying \"I can do this better myself\". Patient then tied tourniquet on his own arm and began to look for vein. Patient then states \"fuck this i'm leaving\" and leaves room. Patient leaving the ED at this time. Dr. Paulo Garcia at the bedside. 1.52

## 2023-07-14 NOTE — PRE-ANESTHESIA EVALUATION ADULT - NSANTHSNORERD_ENT_A_CORE
Gastroenterology Clinic Visit    Ramya Glez  [id number]  Chief Complaint   Patient presents with    Follow-up    Dysphagia    Gastroesophageal Reflux     HPI: 71 y.o. male presents to the clinic with ongoing symptoms of intermittent dysphagia, additionally has intermittent reflux symptoms. The symptoms occur when he goes off the PPI therapy. He has tried to go off the PPI therapy multiple times only to have symptoms recur 2 to 3 days later. He chokes on solid foods intermittently. Denies any hematemesis melena weight loss or weight gain. Previously seen in GI clinic on October 2019 at which time had reported complete resolution of symptoms with PPI therapy. Previous GI work up/Endoscopic investigations:   EGD and colonoscopy: 2011: Reports having esophageal dilation, colonoscopy was noted to be normal without any polyps    Review of Systems   All other systems reviewed and are negative. Past Medical History:   Diagnosis Date    GERD (gastroesophageal reflux disease)     PSA elevation      Past Surgical History:   Procedure Laterality Date    PROSTATE BIOPSY      x2     Current Outpatient Medications on File Prior to Visit   Medication Sig Dispense Refill    omeprazole (PRILOSEC) 20 MG delayed release capsule Take 1 capsule by mouth Daily 30 capsule 3    vitamin C (ASCORBIC ACID) 500 MG tablet Take 500 mg by mouth daily       No current facility-administered medications on file prior to visit.       Family History   Problem Relation Age of Onset    High Blood Pressure Mother     Cancer Mother         ovarian    High Blood Pressure Father     Emphysema Father     Cancer Father         prostate    Colon Cancer Neg Hx      Social History     Socioeconomic History    Marital status:      Spouse name: None    Number of children: None    Years of education: None    Highest education level: None   Occupational History    None   Social Needs    Financial resource strain: None   [place] insecurity     Worry: None     Inability: None    Transportation needs     Medical: None     Non-medical: None   Tobacco Use    Smoking status: Never Smoker    Smokeless tobacco: Never Used   Substance and Sexual Activity    Alcohol use: Not Currently     Alcohol/week: 0.0 standard drinks    Drug use: No    Sexual activity: Yes     Partners: Female   Lifestyle    Physical activity     Days per week: None     Minutes per session: None    Stress: None   Relationships    Social connections     Talks on phone: None     Gets together: None     Attends Islam service: None     Active member of club or organization: None     Attends meetings of clubs or organizations: None     Relationship status: None    Intimate partner violence     Fear of current or ex partner: None     Emotionally abused: None     Physically abused: None     Forced sexual activity: None   Other Topics Concern    None   Social History Narrative    None     Pulse 63, height 5' 8\" (1.727 m), weight 168 lb (76.2 kg), SpO2 99 %. Physical Exam  Constitutional:       General: He is not in acute distress. Appearance: Normal appearance. He is well-developed. Eyes:      General: No scleral icterus. Cardiovascular:      Rate and Rhythm: Normal rate and regular rhythm. Pulmonary:      Effort: Pulmonary effort is normal.      Breath sounds: Normal breath sounds. Abdominal:      General: Bowel sounds are normal. There is no distension. Palpations: Abdomen is soft. There is no mass. Tenderness: There is no abdominal tenderness. There is no guarding or rebound. Musculoskeletal: Normal range of motion. Lymphadenopathy:      Cervical: No cervical adenopathy. Neurological:      Mental Status: He is alert and oriented to person, place, and time. Psychiatric:         Behavior: Behavior normal.         Thought Content:  Thought content normal.         Judgment: Judgment normal.       Laboratory, Pathology, Radiology reviewed indetail with relevant important investigations summarized below:  Lab Results   Component Value Date    WBC 6.2 08/13/2019    HGB 14.6 08/13/2019    HCT 42.6 08/13/2019    MCV 90.2 08/13/2019     08/13/2019     Lab Results   Component Value Date    ALT 17 08/13/2019    AST 23 08/13/2019    ALKPHOS 73 08/13/2019    BILITOT 0.5 08/13/2019     Assessment and Plan:  71 y.o. male with   1. Gastroesophageal reflux disease, unspecified whether esophagitis present  2. Esophageal dysphagia  -Multiple attempts to go off PPI have been unsuccessful, patient advised to continue at a small dose of PPI on a regular basis, will consider switching to Prilosec 10 mg after endoscopic evaluation  -With ongoing intermittent recurrent dysphagia recommend EGD +/- dilation    3. Screening for colon cancer  - Colonoscopy is indicated, procedure was discussed at length, including the risks and benefits. Split prep was prescribed and discussed. Importance of the prep in ensuring a good exam was emphasized. -Suprep    4. Preop testing  - COVID-19 Ambulatory; Future    Follow-up to be scheduled based on endoscopic evaluation and findings    Sander Vargas MD   Staff Gastroenterologist  [facility]    Please note this report has been partially produced using speech recognition software and may cause contain errors related to thatsystem including grammar, punctuation and spelling as well as words and phrases that may seem inappropriate. If there are questions or concerns please feel free to contact me to clarify. No

## 2023-07-18 ENCOUNTER — APPOINTMENT (OUTPATIENT)
Dept: PLASTIC SURGERY | Facility: CLINIC | Age: 53
End: 2023-07-18
Payer: COMMERCIAL

## 2023-07-18 PROCEDURE — 99024 POSTOP FOLLOW-UP VISIT: CPT

## 2023-07-18 NOTE — ASSESSMENT
[FreeTextEntry1] : ROBERTO drains in place, not ready for removal\par Gauze/ Sports bra\par Shower ok - face away from water\par Ok to take ibuprofen, valium\par Rx for PT given\par \par RTC for drain removal - will f/u w/ NP Friday\par RTC in 2 weeks

## 2023-07-18 NOTE — HISTORY OF PRESENT ILLNESS
[FreeTextEntry1] : 51 y/o female presents 4 days s/p bilateral breast reconstruction with T/E on 07/14/2023. Denies any f/c/n/v. She is taking tylenol/ibuprofen and oxycodone at night for pain.  She does not want to increase ibuprofen due to nose bleed. She has 2 ROBERTO drains in place, not ready for removal.

## 2023-07-18 NOTE — PHYSICAL EXAM
[de-identified] : Incisions c/d/i, no collections or s/sx of infection, ROBERTO drains in place --> serosang fluid, not ready for removal, CHG dressing changed, PO edema UOQ b/l \par

## 2023-07-20 LAB — SURGICAL PATHOLOGY STUDY: SIGNIFICANT CHANGE UP

## 2023-07-21 ENCOUNTER — NON-APPOINTMENT (OUTPATIENT)
Age: 53
End: 2023-07-21

## 2023-07-24 ENCOUNTER — APPOINTMENT (OUTPATIENT)
Dept: PLASTIC SURGERY | Facility: CLINIC | Age: 53
End: 2023-07-24
Payer: COMMERCIAL

## 2023-07-24 PROCEDURE — 99024 POSTOP FOLLOW-UP VISIT: CPT

## 2023-07-24 NOTE — HISTORY OF PRESENT ILLNESS
[FreeTextEntry1] : 51 y/o female presents 4 days s/p bilateral breast reconstruction with T/E on 07/14/2023. Denies any f/c/n/v. She is taking tylenol/ibuprofen and 1/2 valium at night for pain. She has 2 ROBERTO drains in place, ready for removal.

## 2023-07-24 NOTE — ASSESSMENT
[FreeTextEntry1] : ROBERTO drains removed\par Aquaphor to steris\par Gauze/ Sports bra\par \par RTC in 1 week for Dr. Lerman

## 2023-07-24 NOTE — PHYSICAL EXAM
[de-identified] : Incisions healing well, no collections or s/sx of infection, ROBERTO drains removed, PO edema UOQ b/l \par

## 2023-07-25 ENCOUNTER — APPOINTMENT (OUTPATIENT)
Dept: BREAST CENTER | Facility: CLINIC | Age: 53
End: 2023-07-25

## 2023-07-25 ENCOUNTER — APPOINTMENT (OUTPATIENT)
Dept: BREAST CENTER | Facility: CLINIC | Age: 53
End: 2023-07-25
Payer: COMMERCIAL

## 2023-07-25 ENCOUNTER — NON-APPOINTMENT (OUTPATIENT)
Age: 53
End: 2023-07-25

## 2023-07-25 VITALS
BODY MASS INDEX: 20.73 KG/M2 | DIASTOLIC BLOOD PRESSURE: 72 MMHG | WEIGHT: 117 LBS | SYSTOLIC BLOOD PRESSURE: 126 MMHG | HEIGHT: 63 IN | HEART RATE: 76 BPM

## 2023-07-25 PROCEDURE — 99024 POSTOP FOLLOW-UP VISIT: CPT

## 2023-07-27 NOTE — DATA REVIEWED
[FreeTextEntry1] : 10/19/17: Mark MG & US ([facility]): extremely dense, US – multiple stable circumscribed solid nodules c/w fibroadenomas including: R – 0.5cm 12:00 2FN, 0.7cm 12:30 2FN, 0.9cm 1:00 5FN, 0.5cm 7:00 3FN, 0.4cm 8:00 3FN, 0.9cm 9:00 2FN, 0.5cm 10:00 3FN, L – 0.7cm 12:00 3FN, 0.9cm 12:00 2FN, 0.5cm 1:00 4FN, 0.8cm 2:00 2FN, 0.9cm 3:00, 1cm 10:00 2FN, 0.4cm 10:30 1FN. BIRADS 2. \par 10/18/18: Mark MG & US ([facility]): R – interval development of a 1cm irregular shaped mass 12:00 5FN assoc. w/ two adjacent coarse calcs confirmed on additional imaging, 0.9cm mass 2cm back from the nipple, L – 0.8cm mass 2:00 1-2cm back from the nipple, 0.5cm mass 12:00; US – stable mark solid masses c/w multiple fibroadenomata, R – 0.9cm solid mass 1:00 5FN c/w suspicious mass on MG – US bx rec. BIRADS 4. MRI rec d/t potential high risk status\par 10/18/18: R FNA: proliferative fibrocystic change\par 10/24/18: MRI ([facility]): heterogeneously dense fibroglandular tissue w/ mild background parenchymal enhancement characterized by few scattered foci of parenchymal enhancement, R – new 1cm enhancing mass w/ angular and indistinct margins UOQ likely c/w irregular mass on MG & US, L – 0.9cm oval enhancing mass w/ circumscribed margins UIQ 3-4FN decreased in size from prior MRI c/w fibroadenoma, 0.7cm oval circumscribed nonenhancing mass 3-4FN. BIRADS 4 US bx rec for R 1cm irregular mass. \par 10/24/19: Mark MG & US ([facility]): extremely dense, R – few stable benign calcs, US – multiple stable benign nodules mark. BIRADS 2.\par 10/29/19: MRI (R): heterogeneously dense fibroglandular tissue w/ marked background parenchymal enhancemen, L – 0.7cm oval enhancing mass w/ circumscribed margins UIQ 4FN, R – no enhancing mass UIQ that c/w finding on prior study s/p benign bx. BIRADS 2.\par 10/1/20: Mark MG & US (R): extremely dense, ANANYA, US – numerous mark benign nodules. BIRADS 2. \par 1/12/21: MR: heterogeneously dense fibroglandular tissue w/ mild background parenchymal enhancements, scattered foci of enhancement mark w/o dominant suspicious focus, L – 0.7cm stable enhancing mass w/ circumscribed borders upper inner quadrant likely fibroadenoma. BIRADS 2. \par \par 10/4/21 ([facility]) bilateral sMMG/US: extremely dense. There are numerous bilateral benign-appearing cystic and solid nodules, see full report. FOLLOW-UP: Annual screening. BIRADS-2: Benign. \par \par 4/5/2022 ([facility]) bilateral MRI: Again seen is a stable 7 mm enhancing mass in the upper inner left breast and most consistent with benignity. Scattered foci of enhancement are again seen in the right breast without significant change and suggestive of background.No MR evidence of malignancy.  FOLLOW-UP: Annual screening. BI-RADS 2:  Benign. \par \par 10/5/22 ([facility]) B/L sMMG/US: heterogeneously dense. Multiple bilateral benign cysts and stable oval hypoechoic masses, see full report. Benign findings. No mammographic or US evidence of malignancy. FOLLOW-UP: annual screening. BI-RADS 2:  Benign. \par \par 4/11/23 ([facility]) B/L MRI: No MR evidence of malignancy. No interval change. Patient would be due for her annual screening mammogram in October 2023. BIRADS 2.\par \par 7/14/23 ([facility]): \par LEFT BREAST NIPPLE SPARING MASTECTOMY: Fibroadenomas and fibrocystic changes with calcifications. Benign skin.\par RIGHT BREAST NIPPLE SPARING MASTECTOMY:  Ductal carcinoma in situ (DCIS), solid type with intermediate nuclear grade, 1 mm focus, see synoptic report. DCIS is present in upper outer quadrant. All margins over 10 mm. Fibroadenomas, minute radial scar and fibrocystic changes with calcifications. Benign skin and skeletal muscle. ER >99% CA >99%

## 2023-07-27 NOTE — PAST MEDICAL HISTORY
[History of Hormone Replacement Treatment] : has no history of hormone replacement treatment [FreeTextEntry5] : no [FreeTextEntry6] : no [FreeTextEntry7] : no [FreeTextEntry8] : yes

## 2023-07-27 NOTE — HISTORY OF PRESENT ILLNESS
[FreeTextEntry1] : 52 year yo patient presents for post-op evaluation s/p bilateral nipple-sparing mastectomies and bilateral magtrace  and TE reconstruction with Dr. Lerman on 7/14/23 as patient was at high risk due to strong family history, noted below.\par \par Final surgical pathology yielded LEFT breast mastectomy with fibroadenomas and fibrocystic changes with calcifications, benign skin. RIGHT breast mastectomy with a 1mm focus of DCIS in the UOQ, solid type with intermediate nuclear grade, ER >99% [place] >99%, all margins over 10 mm, fibroadenomas, minute radial scar and fibrocystic changes with calcifications, benign skin and skeletal muscle. \par \par Denies any fever, chills, redness, pain, or discharge at the incision site.\par \par Patient has significant family history of breast ca mother 58, maternal aunt 56, and paternal great aunt 60, sister with LCIS x2 age 30s then 40s. Of note, patient with another sister in the [facility], age 54, recently diagnosed with breast cancer, genetics unknown. Genetic Testing January 2021 negative.\par \par Patient is a former patient of Dr. De Paz and has a history of benign bilateral FNA's. \par B/L sMMG/US 10/5/22 BI-RADS 2. Most recent imaging, B/L MRI 4/11/23  BIRADS 2.\par \par Patient had previously established care Dr. [facility] to discuss medical management risk reduction with tamoxifen.\par \par Patient states she has history of asymptomatic bradycardia. No PMHx of HTN, DM, blood clots, COPD, issues with anesthesia. Patient denies any known drug allergies.

## 2023-07-27 NOTE — ASSESSMENT
[FreeTextEntry1] : 52 year yo patient presents for post-op evaluation s/p bilateral nipple-sparing mastectomies and bilateral magtrace with TE reconstruction on 7/14/23 as patient was at high risk due to strong family history, noted below.. \par \par Final surgical pathology yielded LEFT breast mastectomy with fibroadenomas and fibrocystic changes with calcifications, benign skin. RIGHT breast mastectomy with a 1mm focus of DCIS in the UOQ, solid type with intermediate nuclear grade, ER >99% MD >99%, all margins over 10 mm, fibroadenomas, minute radial scar and fibrocystic changes with calcifications, benign skin and skeletal muscle. \par \par Patient is doing well post-operatively. Patient is to return in 3 months for reexamination. Patient verbalizes understanding and is in agreement with the plan.\par \par

## 2023-07-28 ENCOUNTER — NON-APPOINTMENT (OUTPATIENT)
Age: 53
End: 2023-07-28

## 2023-08-01 ENCOUNTER — APPOINTMENT (OUTPATIENT)
Dept: PLASTIC SURGERY | Facility: CLINIC | Age: 53
End: 2023-08-01
Payer: COMMERCIAL

## 2023-08-01 VITALS — HEIGHT: 63 IN | OXYGEN SATURATION: 100 % | HEART RATE: 68 BPM | WEIGHT: 117 LBS | BODY MASS INDEX: 20.73 KG/M2

## 2023-08-01 PROCEDURE — 99024 POSTOP FOLLOW-UP VISIT: CPT

## 2023-08-01 RX ORDER — OXYCODONE 5 MG/1
5 TABLET ORAL
Qty: 12 | Refills: 0 | Status: DISCONTINUED | COMMUNITY
Start: 2023-07-06 | End: 2023-08-01

## 2023-08-01 RX ORDER — DIAZEPAM 5 MG/1
5 TABLET ORAL
Qty: 10 | Refills: 0 | Status: DISCONTINUED | COMMUNITY
Start: 2023-07-06 | End: 2023-08-01

## 2023-08-01 NOTE — ASSESSMENT
[FreeTextEntry1] : Healing well 2 weeks PO - Filled Jessica today 240cc BL (300cc expanders)  Gave script for PT -  Reviewed PO care instructions Aquaphor to all incisions RTC in 3 weeks

## 2023-08-01 NOTE — HISTORY OF PRESENT ILLNESS
[FreeTextEntry1] : 53 y/o female presents 18 days s/p bilateral breast reconstruction with T/E on 07/14/2023. Denies any f/c/n/v. She is taking Tylenol and ibuprofen prn. Patient stop taking valium a week ago. Patient c/o pain where the incisions are. Patient is here for possible T/E fill.

## 2023-08-01 NOTE — PHYSICAL EXAM
[de-identified] : Incisions healing well, no collections or s/sx of infection, ROBERTO drains removed, PO edema UOQ b/l \par

## 2023-08-22 ENCOUNTER — APPOINTMENT (OUTPATIENT)
Dept: PLASTIC SURGERY | Facility: CLINIC | Age: 53
End: 2023-08-22
Payer: COMMERCIAL

## 2023-08-22 VITALS — BODY MASS INDEX: 20.02 KG/M2 | HEART RATE: 72 BPM | WEIGHT: 113 LBS | HEIGHT: 63 IN | OXYGEN SATURATION: 100 %

## 2023-08-22 PROCEDURE — 99024 POSTOP FOLLOW-UP VISIT: CPT

## 2023-08-22 NOTE — HISTORY OF PRESENT ILLNESS
[FreeTextEntry1] : 51 y/o female s/p bilateral breast reconstruction with T/E on 07/14/2023. Denies any f/c/n/v. She is taking ibuprofen prn. She is doing PT twice a week at [facility].  She will not need chemo or radiation. Patient is here for T/E fill.
patient's belongings returned

## 2023-08-22 NOTE — ASSESSMENT
[FreeTextEntry1] : Healing well - Filled Jessica today 270cc BL (300cc expanders)  Continue PT  Aquaphor to all incisions RTC end of October

## 2023-08-22 NOTE — PHYSICAL EXAM
[de-identified] : Incisions healing well, no collections or s/sx of infection, PO edema UOQ b/l, filled today + 30 cc b/l

## 2023-09-27 ENCOUNTER — NON-APPOINTMENT (OUTPATIENT)
Age: 53
End: 2023-09-27

## 2023-10-10 ENCOUNTER — APPOINTMENT (OUTPATIENT)
Dept: BREAST CENTER | Facility: CLINIC | Age: 53
End: 2023-10-10

## 2023-10-20 ENCOUNTER — APPOINTMENT (OUTPATIENT)
Dept: BREAST CENTER | Facility: CLINIC | Age: 53
End: 2023-10-20
Payer: COMMERCIAL

## 2023-10-20 ENCOUNTER — APPOINTMENT (OUTPATIENT)
Dept: BREAST CENTER | Facility: CLINIC | Age: 53
End: 2023-10-20

## 2023-10-20 VITALS
HEART RATE: 62 BPM | WEIGHT: 115 LBS | OXYGEN SATURATION: 99 % | DIASTOLIC BLOOD PRESSURE: 76 MMHG | BODY MASS INDEX: 20.38 KG/M2 | SYSTOLIC BLOOD PRESSURE: 131 MMHG | HEIGHT: 63 IN

## 2023-10-20 PROCEDURE — 99213 OFFICE O/P EST LOW 20 MIN: CPT

## 2023-10-25 ENCOUNTER — APPOINTMENT (OUTPATIENT)
Dept: PLASTIC SURGERY | Facility: CLINIC | Age: 53
End: 2023-10-25
Payer: COMMERCIAL

## 2023-10-25 PROCEDURE — 99213 OFFICE O/P EST LOW 20 MIN: CPT

## 2023-12-18 ENCOUNTER — APPOINTMENT (OUTPATIENT)
Dept: PLASTIC SURGERY | Facility: CLINIC | Age: 53
End: 2023-12-18
Payer: COMMERCIAL

## 2023-12-18 PROCEDURE — 99213 OFFICE O/P EST LOW 20 MIN: CPT | Mod: 95

## 2023-12-19 NOTE — HISTORY OF PRESENT ILLNESS
[FreeTextEntry1] : Patient Name: CONCHITA SANDERS  : 1970  Date: 2023  Attending: Dr. Oren Lerman  HPI: preop consultation for bilateral tissue expander to implant exchange on 23.  Allergies: NKDA Medication prescribed: oxycodone 5 mg   ROS: complete 14 point review of systems negative except pertinent items reviewed in the HPI. Other non-contributory items reviewed in our new patient questionnaire and we have submitted it to be scanned into the medical record.   Physical Exam:  completed at time of in office evaluation   Assessment/Plan: We have discussed pre and postop instructions, recovery limitations, restrictions and expectations, ERAS protocol, NPO status, transportation home, postop medications, compression garments, benefits and risks of the procedure. Pre-op labs reviewed. The patient would like to proceed with surgery as scheduled.  YUE LERNER NP

## 2023-12-28 ENCOUNTER — TRANSCRIPTION ENCOUNTER (OUTPATIENT)
Age: 53
End: 2023-12-28

## 2023-12-28 RX ORDER — CHLORHEXIDINE GLUCONATE 213 G/1000ML
1 SOLUTION TOPICAL DAILY
Refills: 0 | Status: DISCONTINUED | OUTPATIENT
Start: 2023-12-29 | End: 2023-12-29

## 2023-12-28 NOTE — ASU PATIENT PROFILE, ADULT - FALL HARM RISK - UNIVERSAL INTERVENTIONS
Bed in lowest position, wheels locked, appropriate side rails in place/Call bell, personal items and telephone in reach/Instruct patient to call for assistance before getting out of bed or chair/Non-slip footwear when patient is out of bed/[place] to call system/Physically safe environment - no spills, clutter or unnecessary equipment/Purposeful Proactive Rounding/Room/bathroom lighting operational, light cord in reach Bed in lowest position, wheels locked, appropriate side rails in place/Call bell, personal items and telephone in reach/Instruct patient to call for assistance before getting out of bed or chair/Non-slip footwear when patient is out of bed/[place] to call system/Physically safe environment - no spills, clutter or unnecessary equipment/Purposeful Proactive Rounding/Room/bathroom lighting operational, light cord in reach

## 2023-12-28 NOTE — ASU PATIENT PROFILE, ADULT - VISION (WITH CORRECTIVE LENSES IF THE PATIENT USUALLY WEARS THEM):
glaSSES/Partially impaired: cannot see medication labels or newsprint, but can see obstacles in path, and the surrounding layout; can count fingers at arm's length

## 2023-12-28 NOTE — ASU PATIENT PROFILE, ADULT - ABLE TO REACH PT
Voice message: arrival time is at  8:00 am , procedure time is at  12:00 pm  ,  must remain  NPO/NO oslid foods after  2200 pm tonight, allow to drink water or apple juice till 12MN,  need to take medication  for blood  pressure, thyroid, depression ,   before arriving to the hospital if apply to you, NO medicine  for Diabetes, , is takes anticoagulant , for pulmonary, or cardiac issues , please call your MD for advise of this medicine.  dress comfortable, leave all valuables at home,, no jewelry, no lotions,  Bring  Id photo and insurance cards, , escort arranged need to  be a person  18 years old , , telephone  and address  given to patient to call as needed/no Voice message: arrival time is at  8:00 am ,  procedure  time is at   10:00 am  ,  must remain  NPO/NO oslid foods after  2200 pm tonight, allow to drink water or apple juice till 12MN,  need to take medication  for blood  pressure, thyroid, depression ,   before arriving to the hospital if apply to you, NO medicine  for Diabetes, , is takes anticoagulant , for pulmonary, or cardiac issues , please call your MD for advise of this medicine.  dress comfortable, leave all valuables at home,, no jewelry, no lotions,  Bring  Id photo and insurance cards, , escort arranged need to  be a person  18 years old , , telephone  and address  given to patient to call as needed/no Voice message: arrival time is at  8:00 am ,  procedure  time is at   10:00 am  ,  must remain  NPO/NO oslid foods after  2200 pm tonight, allow to drink water or apple juice till 12MN,  need to take medication  for blood  pressure, thyroid, depression ,   before arriving to the hospital if apply to you, NO medicine  for Diabetes, , is takes anticoagulant , for pulmonary, or cardiac issues , please call your MD for advise of this medicine.  dress comfortable, leave all valuables at home,, no jewelry, no lotions,  Bring  Id photo and insurance cards, , escort arranged need to  be a person  18 years old , , telephone  and address  given to patient to call as needed/yes

## 2023-12-28 NOTE — ASU PATIENT PROFILE, ADULT - NS PREOP UNDERSTANDS INFO
Spoke to patient  to fallow above instructions,    . and also  MD instructions  in terms  o drinking fluid before surgery/yes

## 2023-12-29 ENCOUNTER — OUTPATIENT (OUTPATIENT)
Dept: OUTPATIENT SERVICES | Facility: HOSPITAL | Age: 53
LOS: 1 days | Discharge: HOME | End: 2023-12-29
Payer: COMMERCIAL

## 2023-12-29 ENCOUNTER — RESULT REVIEW (OUTPATIENT)
Age: 53
End: 2023-12-29

## 2023-12-29 ENCOUNTER — TRANSCRIPTION ENCOUNTER (OUTPATIENT)
Age: 53
End: 2023-12-29

## 2023-12-29 VITALS
SYSTOLIC BLOOD PRESSURE: 157 MMHG | DIASTOLIC BLOOD PRESSURE: 69 MMHG | TEMPERATURE: 98 F | WEIGHT: 114.64 LBS | HEIGHT: 63 IN | OXYGEN SATURATION: 100 % | RESPIRATION RATE: 16 BRPM | HEART RATE: 64 BPM

## 2023-12-29 VITALS
TEMPERATURE: 98 F | RESPIRATION RATE: 16 BRPM | OXYGEN SATURATION: 98 % | HEART RATE: 79 BPM | DIASTOLIC BLOOD PRESSURE: 60 MMHG | SYSTOLIC BLOOD PRESSURE: 135 MMHG

## 2023-12-29 DIAGNOSIS — Z90.89 ACQUIRED ABSENCE OF OTHER ORGANS: Chronic | ICD-10-CM

## 2023-12-29 DIAGNOSIS — M67.80 OTHER SPECIFIED DISORDERS OF SYNOVIUM AND TENDON, UNSPECIFIED SITE: Chronic | ICD-10-CM

## 2023-12-29 PROCEDURE — 88300 SURGICAL PATH GROSS: CPT | Mod: 26

## 2023-12-29 PROCEDURE — 15772 GRFG AUTOL FAT LIPO EA ADDL: CPT

## 2023-12-29 PROCEDURE — 15771 GRFG AUTOL FAT LIPO 50 CC/<: CPT

## 2023-12-29 PROCEDURE — 19342 INSJ/RPLCMT BRST IMPLT SEP D: CPT | Mod: 50

## 2023-12-29 PROCEDURE — 15777 ACELLULAR DERM MATRIX IMPLT: CPT | Mod: 59

## 2023-12-29 PROCEDURE — 19342 INSJ/RPLCMT BRST IMPLT SEP D: CPT | Mod: AS,50

## 2023-12-29 DEVICE — IMPLANTABLE DEVICE: Type: IMPLANTABLE DEVICE | Site: BILATERAL | Status: FUNCTIONAL

## 2023-12-29 RX ORDER — ONDANSETRON 8 MG/1
4 TABLET, FILM COATED ORAL ONCE
Refills: 0 | Status: DISCONTINUED | OUTPATIENT
Start: 2023-12-29 | End: 2023-12-29

## 2023-12-29 RX ORDER — APREPITANT 80 MG/1
40 CAPSULE ORAL ONCE
Refills: 0 | Status: COMPLETED | OUTPATIENT
Start: 2023-12-29 | End: 2023-12-29

## 2023-12-29 RX ORDER — SODIUM CHLORIDE 9 MG/ML
1000 INJECTION, SOLUTION INTRAVENOUS
Refills: 0 | Status: DISCONTINUED | OUTPATIENT
Start: 2023-12-29 | End: 2023-12-29

## 2023-12-29 RX ORDER — OXYCODONE HYDROCHLORIDE 5 MG/1
5 TABLET ORAL ONCE
Refills: 0 | Status: DISCONTINUED | OUTPATIENT
Start: 2023-12-29 | End: 2023-12-29

## 2023-12-29 RX ORDER — ACETAMINOPHEN 500 MG
1000 TABLET ORAL ONCE
Refills: 0 | Status: COMPLETED | OUTPATIENT
Start: 2023-12-29 | End: 2023-12-29

## 2023-12-29 RX ORDER — FENTANYL CITRATE 50 UG/ML
50 INJECTION INTRAVENOUS ONCE
Refills: 0 | Status: DISCONTINUED | OUTPATIENT
Start: 2023-12-29 | End: 2023-12-29

## 2023-12-29 RX ADMIN — SODIUM CHLORIDE 100 MILLILITER(S): 9 INJECTION, SOLUTION INTRAVENOUS at 12:58

## 2023-12-29 RX ADMIN — FENTANYL CITRATE 50 MICROGRAM(S): 50 INJECTION INTRAVENOUS at 10:44

## 2023-12-29 RX ADMIN — Medication 1000 MILLIGRAM(S): at 06:21

## 2023-12-29 RX ADMIN — APREPITANT 40 MILLIGRAM(S): 80 CAPSULE ORAL at 06:21

## 2023-12-29 RX ADMIN — CHLORHEXIDINE GLUCONATE 1 APPLICATION(S): 213 SOLUTION TOPICAL at 06:22

## 2023-12-29 NOTE — BRIEF OPERATIVE NOTE - NSICDXBRIEFPREOP_GEN_ALL_CORE_FT
PRE-OP DIAGNOSIS:  Breast reconstruction deformity 29-Dec-2023 10:23:55  Kandis Blum  Encounter for breast reconstruction following mastectomy 29-Dec-2023 10:24:28  Kandis Blum  Acquired absence of breast, bilateral 29-Dec-2023 10:25:19  Kandis Blum

## 2023-12-29 NOTE — ASU DISCHARGE PLAN (ADULT/PEDIATRIC) - NS MD DC FALL RISK RISK
For information on Fall & Injury Prevention, visit: [email or web address] OR  [email or web address] OR  https://www.cdc.gov/steadi/patient.html For information on Fall & Injury Prevention, visit: [email or web address] OR  [email or web address] OR  https://www.cdc.gov/steadi/patient.html

## 2023-12-29 NOTE — ASU PREOP CHECKLIST - SELECT TESTS ORDERED
Tisha notified   She will discontinue Avapro   Rx for Avalide sent to pharmacy   She scheduled follow up appt in 2 weeks  Verb understanding given    no S/sx covid no S/sx covid/CBC/CMP/PT/PTT/Hepatic Function/EKG

## 2023-12-29 NOTE — BRIEF OPERATIVE NOTE - NSICDXBRIEFPROCEDURE_GEN_ALL_CORE_FT
PROCEDURES:  Reconstruction, breast, stage 2, using implant and fat graft 29-Dec-2023 10:23:08  Kandis Blum  Fat grafting 29-Dec-2023 10:23:22  Kandis Blum

## 2024-01-02 ENCOUNTER — APPOINTMENT (OUTPATIENT)
Dept: PLASTIC SURGERY | Facility: CLINIC | Age: 54
End: 2024-01-02
Payer: COMMERCIAL

## 2024-01-02 PROBLEM — D05.10 INTRADUCTAL CARCINOMA IN SITU OF UNSPECIFIED BREAST: Chronic | Status: ACTIVE | Noted: 2023-12-29

## 2024-01-02 PROCEDURE — 99024 POSTOP FOLLOW-UP VISIT: CPT

## 2024-01-02 RX ORDER — OXYCODONE 5 MG/1
5 TABLET ORAL
Qty: 12 | Refills: 0 | Status: DISCONTINUED | COMMUNITY
Start: 2023-12-18 | End: 2024-01-02

## 2024-01-02 NOTE — HISTORY OF PRESENT ILLNESS
[FreeTextEntry1] : 52 y/o female presents 4 days s/p bilateral T/E to implant exchange and fat grafting. Denies any f/c/n/v. Patient is taking advil for the pain. Patient has no concern. She is very happy with the results.

## 2024-01-02 NOTE — SURGICAL HISTORY
[de-identified] : 07/14/2023: Bilateral breast reconstruction with T/E with Dr. Ellis [de-identified] : 12/29/2023: Bilateral T/E to implant exchange and fat grafting from abdomen- allergan silicone SCM

## 2024-01-02 NOTE — PHYSICAL EXAM
[de-identified] : Implants in good position, no collections or infection, good contour. improved symmetry.  [de-identified] : fat graft donor site mild echymosis, sutures removed, no collections - healing well.

## 2024-01-05 ENCOUNTER — APPOINTMENT (OUTPATIENT)
Dept: HEART AND VASCULAR | Facility: CLINIC | Age: 54
End: 2024-01-05
Payer: COMMERCIAL

## 2024-01-05 VITALS
BODY MASS INDEX: 20.38 KG/M2 | HEART RATE: 72 BPM | OXYGEN SATURATION: 99 % | SYSTOLIC BLOOD PRESSURE: 122 MMHG | DIASTOLIC BLOOD PRESSURE: 76 MMHG | WEIGHT: 115 LBS | TEMPERATURE: 97.5 F | HEIGHT: 63 IN

## 2024-01-05 DIAGNOSIS — I35.1 NONRHEUMATIC AORTIC (VALVE) INSUFFICIENCY: ICD-10-CM

## 2024-01-05 DIAGNOSIS — R00.1 BRADYCARDIA, UNSPECIFIED: ICD-10-CM

## 2024-01-05 PROCEDURE — 99213 OFFICE O/P EST LOW 20 MIN: CPT

## 2024-01-05 NOTE — DISCUSSION/SUMMARY
[FreeTextEntry1] : stable exam bradycardia- recent non invasive cardiac eval, results reviewed, bradycardia from increased vagal tone of exercise/ reassurance AR- repeat echo in 6 months

## 2024-01-05 NOTE — HISTORY OF PRESENT ILLNESS
[FreeTextEntry1] : concerned applewatch going off with HR under 40 no associated symptoms walks 8-10 miles daily

## 2024-01-15 LAB
SURGICAL PATHOLOGY STUDY: SIGNIFICANT CHANGE UP
SURGICAL PATHOLOGY STUDY: SIGNIFICANT CHANGE UP

## 2024-04-09 ENCOUNTER — APPOINTMENT (OUTPATIENT)
Dept: PLASTIC SURGERY | Facility: CLINIC | Age: 54
End: 2024-04-09
Payer: COMMERCIAL

## 2024-04-09 DIAGNOSIS — Z42.1 ENCOUNTER FOR BREAST RECONSTRUCTION FOLLOWING MASTECTOMY: ICD-10-CM

## 2024-04-09 DIAGNOSIS — Z91.89 OTHER SPECIFIED PERSONAL RISK FACTORS, NOT ELSEWHERE CLASSIFIED: ICD-10-CM

## 2024-04-09 DIAGNOSIS — Z40.01 ENCOUNTER FOR PROPHYLACTIC REMOVAL OF BREAST: ICD-10-CM

## 2024-04-09 PROCEDURE — 99212 OFFICE O/P EST SF 10 MIN: CPT

## 2024-04-12 PROBLEM — Z90.13 ABSENCE OF BREAST, BILATERAL: Status: ACTIVE | Noted: 2023-10-25

## 2024-04-12 PROBLEM — D05.11 DUCTAL CARCINOMA IN SITU (DCIS) OF RIGHT BREAST: Status: ACTIVE | Noted: 2023-07-25

## 2024-04-12 PROBLEM — Z80.3 FAMILY HISTORY OF BREAST CANCER: Status: ACTIVE | Noted: 2020-12-22

## 2024-04-12 PROBLEM — Z08 ENCOUNTER FOR FOLLOW-UP SURVEILLANCE OF BREAST CANCER: Status: ACTIVE | Noted: 2023-10-20

## 2024-04-15 PROBLEM — Z91.89 AT HIGH RISK FOR BREAST CANCER: Status: ACTIVE | Noted: 2021-07-13

## 2024-04-15 PROBLEM — Z42.1 ENCOUNTER FOR BREAST RECONSTRUCTION FOLLOWING MASTECTOMY: Status: ACTIVE | Noted: 2023-07-24

## 2024-04-15 PROBLEM — Z40.01 ENCOUNTER FOR PROPHYLACTIC REMOVAL OF BOTH BREASTS: Status: ACTIVE | Noted: 2022-05-17

## 2024-04-15 NOTE — SURGICAL HISTORY
[de-identified] : 07/14/2023: Bilateral breast reconstruction with T/E with Dr. Ellis [de-identified] : 12/29/2023: Bilateral T/E to implant exchange and fat grafting from abdomen- allergan silicone SCM

## 2024-04-15 NOTE — HISTORY OF PRESENT ILLNESS
[FreeTextEntry1] : 52 y/o female s/p bilateral T/E to implant exchange and fat grafting on 12/29/2023. Denies any f/c/n/v. Patient is taking advil for the pain.  She is going to [facility] for PT. She is very happy with the results.

## 2024-04-15 NOTE — PHYSICAL EXAM
[de-identified] : fat graft donor site mild echymosis, sutures removed, no collections - healing well.  [de-identified] : Implants in good position, soft, no collections or infection, good contour and symmetry. Estephanie I

## 2024-04-15 NOTE — REASON FOR VISIT
[Post Op: _________] : a [unfilled] post op visit [FreeTextEntry1] : Dr. Ellis [Follow-Up: _____] : a [unfilled] follow-up visit

## 2024-04-15 NOTE — ASSESSMENT
[FreeTextEntry1] : Healing well. excellent result.  Dinah is very happy with the results.  Massage  Continue PT  PO photos taken RTC in October 2024

## 2024-04-17 ENCOUNTER — APPOINTMENT (OUTPATIENT)
Dept: BREAST CENTER | Facility: CLINIC | Age: 54
End: 2024-04-17
Payer: COMMERCIAL

## 2024-04-17 VITALS
SYSTOLIC BLOOD PRESSURE: 120 MMHG | HEIGHT: 63 IN | HEART RATE: 63 BPM | WEIGHT: 116 LBS | DIASTOLIC BLOOD PRESSURE: 68 MMHG | BODY MASS INDEX: 20.55 KG/M2

## 2024-04-17 DIAGNOSIS — D05.11 INTRADUCTAL CARCINOMA IN SITU OF RIGHT BREAST: ICD-10-CM

## 2024-04-17 DIAGNOSIS — Z80.3 FAMILY HISTORY OF MALIGNANT NEOPLASM OF BREAST: ICD-10-CM

## 2024-04-17 DIAGNOSIS — Z08 ENCOUNTER FOR FOLLOW-UP EXAMINATION AFTER COMPLETED TREATMENT FOR MALIGNANT NEOPLASM: ICD-10-CM

## 2024-04-17 DIAGNOSIS — Z85.3 ENCOUNTER FOR FOLLOW-UP EXAMINATION AFTER COMPLETED TREATMENT FOR MALIGNANT NEOPLASM: ICD-10-CM

## 2024-04-17 DIAGNOSIS — Z90.13 ACQUIRED ABSENCE OF BILATERAL BREASTS AND NIPPLES: ICD-10-CM

## 2024-04-17 PROCEDURE — 99213 OFFICE O/P EST LOW 20 MIN: CPT

## 2024-04-17 RX ORDER — CALCIUM CARBONATE/VITAMIN D3 600 MG-20
TABLET ORAL
Refills: 0 | Status: ACTIVE | COMMUNITY

## 2024-04-23 NOTE — PAST MEDICAL HISTORY
[Menarche Age ____] : age at menarche was [unfilled] [Menopause Age____] : age at menopause was [unfilled] [Definite ___ (Date)] : the last menstrual period was [unfilled] [Irregular Cycle Intervals] : are  irregular [Total Preg ___] : G[unfilled] [Live Births ___] : P[unfilled]  [Age At Live Birth ___] : Age at live birth: [unfilled] [History of Hormone Replacement Treatment] : has no history of hormone replacement treatment [FreeTextEntry5] : no [FreeTextEntry6] : no [FreeTextEntry7] : no [FreeTextEntry8] : yes

## 2024-04-23 NOTE — PHYSICAL EXAM
[Supple] : supple [No Supraclavicular Adenopathy] : no supraclavicular adenopathy [No Cervical Adenopathy] : no cervical adenopathy [Examined in the supine and seated position] : examined in the supine and seated position [Symmetrical] : symmetrical [No dominant masses] : no dominant masses in right breast  [No dominant masses] : no dominant masses left breast [No Axillary Lymphadenopathy] : no left axillary lymphadenopathy [de-identified] : well-healed incisions; b/l implants in place

## 2024-04-23 NOTE — DATA REVIEWED
[FreeTextEntry1] : 10/19/17: Mark MG & US ([facility]): extremely dense, US - multiple stable circumscribed solid nodules c/w fibroadenomas including: R - 0.5cm 12:00 2FN, 0.7cm 12:30 2FN, 0.9cm 1:00 5FN, 0.5cm 7:00 3FN, 0.4cm 8:00 3FN, 0.9cm 9:00 2FN, 0.5cm 10:00 3FN, L - 0.7cm 12:00 3FN, 0.9cm 12:00 2FN, 0.5cm 1:00 4FN, 0.8cm 2:00 2FN, 0.9cm 3:00, 1cm 10:00 2FN, 0.4cm 10:30 1FN. BIRADS 2. \par  10/18/18: Mark MG & US ([facility]): R - interval development of a 1cm irregular shaped mass 12:00 5FN assoc. w/ two adjacent coarse calcs confirmed on additional imaging, 0.9cm mass 2cm back from the nipple, L - 0.8cm mass 2:00 1-2cm back from the nipple, 0.5cm mass 12:00; US - stable mark solid masses c/w multiple fibroadenomata, R - 0.9cm solid mass 1:00 5FN c/w suspicious mass on MG - US bx rec. BIRADS 4. MRI rec d/t potential high risk status\par  10/18/18: R FNA: proliferative fibrocystic change\par  10/24/18: MRI ([facility]): heterogeneously dense fibroglandular tissue w/ mild background parenchymal enhancement characterized by few scattered foci of parenchymal enhancement, R - new 1cm enhancing mass w/ angular and indistinct margins UOQ likely c/w irregular mass on MG & US, L - 0.9cm oval enhancing mass w/ circumscribed margins UIQ 3-4FN decreased in size from prior MRI c/w fibroadenoma, 0.7cm oval circumscribed nonenhancing mass 3-4FN. BIRADS 4 US bx rec for R 1cm irregular mass. \par  10/24/19: Mark MG & US ([facility]): extremely dense, R - few stable benign calcs, US - multiple stable benign nodules mark. BIRADS 2.\par  10/29/19: MRI (R): heterogeneously dense fibroglandular tissue w/ marked background parenchymal enhancemen, L - 0.7cm oval enhancing mass w/ circumscribed margins UIQ 4FN, R - no enhancing mass UIQ that c/w finding on prior study s/p benign bx. BIRADS 2.\par  10/1/20: Mark MG & US (R): extremely dense, ANANYA, US - numerous mark benign nodules. BIRADS 2. \par  1/12/21: MR: heterogeneously dense fibroglandular tissue w/ mild background parenchymal enhancements, scattered foci of enhancement mark w/o dominant suspicious focus, L - 0.7cm stable enhancing mass w/ circumscribed borders upper inner quadrant likely fibroadenoma. BIRADS 2. \par  \par  10/4/21 ([facility]) bilateral sMMG/US: extremely dense. There are numerous bilateral benign-appearing cystic and solid nodules, see full report. FOLLOW-UP: Annual screening. BIRADS-2: Benign. \par  \par  4/5/2022 ([facility]) bilateral MRI: Again seen is a stable 7 mm enhancing mass in the upper inner left breast and most consistent with benignity. Scattered foci of enhancement are again seen in the right breast without significant change and suggestive of background.No MR evidence of malignancy.  FOLLOW-UP: Annual screening. BI-RADS 2:  Benign. \par  \par  10/5/22 ([facility]) B/L sMMG/US: heterogeneously dense. Multiple bilateral benign cysts and stable oval hypoechoic masses, see full report. Benign findings. No mammographic or US evidence of malignancy. FOLLOW-UP: annual screening. BI-RADS 2:  Benign. \par  \par  4/11/23 ([facility]) B/L MRI: No MR evidence of malignancy. No interval change. Patient would be due for her annual screening mammogram in October 2023. BIRADS 2.\par  \par  7/14/23 ([facility]): \par  LEFT BREAST NIPPLE SPARING MASTECTOMY: Fibroadenomas and fibrocystic changes with calcifications. Benign skin.\par  RIGHT BREAST NIPPLE SPARING MASTECTOMY:  Ductal carcinoma in situ (DCIS), solid type with intermediate nuclear grade, 1 mm focus, see synoptic report. DCIS is present in upper outer quadrant. All margins over 10 mm. Fibroadenomas, minute radial scar and fibrocystic changes with calcifications. Benign skin and skeletal muscle. ER >99% [place] >99%

## 2024-04-23 NOTE — HISTORY OF PRESENT ILLNESS
[FreeTextEntry1] : 53 year old patient presents for follow up evaluation s/p bilateral nipple-sparing mastectomies and bilateral magtrace and TE reconstruction with Dr. Lerman on 7/14/23 as patient was at high risk due to strong family history, noted below. Patient completed TE to implant exchange 12/29/24 with Dr. Lerman. States she maintained nipple sensation and is doing well post-operatively-- had post-op appt with Dr. Lerman 4/9/24.  Final surgical pathology yielded LEFT breast mastectomy with fibroadenomas and fibrocystic changes with calcifications, benign skin. RIGHT breast mastectomy with a 1mm focus of DCIS in the UOQ, solid type with intermediate nuclear grade, ER >99% [place] >99%, all margins over 10 mm, fibroadenomas, minute radial scar and fibrocystic changes with calcifications, benign skin and skeletal muscle.   Patient has significant family history of breast ca mother 58, maternal aunt 56, and paternal great aunt 60, sister with LCIS x2 age 30s then 40s. Of note, patient with another sister in the [facility], age 54, recently diagnosed with breast cancer and DOD, genetics unknown. Genetic Testing January 2021 negative.  Patient is a former patient of Dr. De Paz and has a history of benign bilateral FNA's. Patient had previously established care Dr. [facility] to discuss medical management risk reduction with tamoxifen.  Patient states she has history of asymptomatic bradycardia. No PMHx of HTN, DM, blood clots, COPD, issues with anesthesia. Patient denies any known drug allergies.

## 2024-04-23 NOTE — ASSESSMENT
[FreeTextEntry1] : 53 year yo patient presents for follow up evaluation s/p bilateral nipple-sparing mastectomies and bilateral magtrace with TE reconstruction on 7/14/23 as patient was at high risk due to strong family history, noted below. Patient has been having TE's filled with Dr. Lerman, reported they will be discussing TE to implant exchange soon.   Final surgical pathology yielded LEFT breast mastectomy with fibroadenomas and fibrocystic changes with calcifications, benign skin. RIGHT breast mastectomy with a 1mm focus of DCIS in the UOQ, solid type with intermediate nuclear grade, ER >99% MT >99%, all margins over 10 mm, fibroadenomas, minute radial scar and fibrocystic changes with calcifications, benign skin and skeletal muscle. Patient completed [facility].  Patient without complaint. Physical exam WNL. Plan for re-examination in 6 months. Patient verbalizes understanding and is in agreement with the plan.

## 2024-07-08 ENCOUNTER — APPOINTMENT (OUTPATIENT)
Dept: HEART AND VASCULAR | Facility: CLINIC | Age: 54
End: 2024-07-08
Payer: COMMERCIAL

## 2024-07-08 ENCOUNTER — APPOINTMENT (OUTPATIENT)
Dept: HEART AND VASCULAR | Facility: CLINIC | Age: 54
End: 2024-07-08

## 2024-07-08 VITALS
DIASTOLIC BLOOD PRESSURE: 70 MMHG | WEIGHT: 116.25 LBS | SYSTOLIC BLOOD PRESSURE: 124 MMHG | OXYGEN SATURATION: 99 % | BODY MASS INDEX: 20.6 KG/M2 | HEART RATE: 78 BPM | HEIGHT: 63 IN

## 2024-07-08 DIAGNOSIS — I35.1 NONRHEUMATIC AORTIC (VALVE) INSUFFICIENCY: ICD-10-CM

## 2024-07-08 PROCEDURE — 93306 TTE W/DOPPLER COMPLETE: CPT

## 2024-08-19 ENCOUNTER — NON-APPOINTMENT (OUTPATIENT)
Age: 54
End: 2024-08-19

## 2024-08-19 ENCOUNTER — TRANSCRIPTION ENCOUNTER (OUTPATIENT)
Age: 54
End: 2024-08-19

## 2024-10-09 ENCOUNTER — APPOINTMENT (OUTPATIENT)
Dept: BREAST CENTER | Facility: CLINIC | Age: 54
End: 2024-10-09

## 2024-10-09 VITALS — HEIGHT: 63 IN | WEIGHT: 114 LBS | BODY MASS INDEX: 20.2 KG/M2

## 2024-10-09 DIAGNOSIS — Z80.3 FAMILY HISTORY OF MALIGNANT NEOPLASM OF BREAST: ICD-10-CM

## 2024-10-09 DIAGNOSIS — Z85.3 ENCOUNTER FOR FOLLOW-UP EXAMINATION AFTER COMPLETED TREATMENT FOR MALIGNANT NEOPLASM: ICD-10-CM

## 2024-10-09 DIAGNOSIS — Z08 ENCOUNTER FOR FOLLOW-UP EXAMINATION AFTER COMPLETED TREATMENT FOR MALIGNANT NEOPLASM: ICD-10-CM

## 2024-10-09 PROCEDURE — 76642 ULTRASOUND BREAST LIMITED: CPT | Mod: LT

## 2024-10-09 PROCEDURE — 99213 OFFICE O/P EST LOW 20 MIN: CPT

## 2024-10-09 RX ORDER — RALOXIFENE HYDROCHLORIDE 60 MG/1
60 TABLET, FILM COATED ORAL
Refills: 0 | Status: ACTIVE | COMMUNITY

## 2024-10-15 ENCOUNTER — APPOINTMENT (OUTPATIENT)
Dept: PLASTIC SURGERY | Facility: CLINIC | Age: 54
End: 2024-10-15
Payer: COMMERCIAL

## 2024-10-15 VITALS — BODY MASS INDEX: 20.2 KG/M2 | HEIGHT: 63 IN | WEIGHT: 114 LBS

## 2024-10-15 DIAGNOSIS — Z42.1 ENCOUNTER FOR BREAST RECONSTRUCTION FOLLOWING MASTECTOMY: ICD-10-CM

## 2024-10-15 DIAGNOSIS — D05.11 INTRADUCTAL CARCINOMA IN SITU OF RIGHT BREAST: ICD-10-CM

## 2024-10-15 PROCEDURE — 99212 OFFICE O/P EST SF 10 MIN: CPT

## 2025-03-28 PROBLEM — R92.30 DENSE BREAST: Status: ACTIVE | Noted: 2021-01-12

## 2025-04-01 ENCOUNTER — NON-APPOINTMENT (OUTPATIENT)
Age: 55
End: 2025-04-01

## 2025-04-02 ENCOUNTER — NON-APPOINTMENT (OUTPATIENT)
Age: 55
End: 2025-04-02

## 2025-04-02 ENCOUNTER — APPOINTMENT (OUTPATIENT)
Dept: BREAST CENTER | Facility: CLINIC | Age: 55
End: 2025-04-02
Payer: COMMERCIAL

## 2025-04-02 VITALS — BODY MASS INDEX: 21.09 KG/M2 | HEIGHT: 63 IN | WEIGHT: 119 LBS

## 2025-04-02 DIAGNOSIS — Z85.3 ENCOUNTER FOR FOLLOW-UP EXAMINATION AFTER COMPLETED TREATMENT FOR MALIGNANT NEOPLASM: ICD-10-CM

## 2025-04-02 DIAGNOSIS — Z08 ENCOUNTER FOR FOLLOW-UP EXAMINATION AFTER COMPLETED TREATMENT FOR MALIGNANT NEOPLASM: ICD-10-CM

## 2025-04-02 DIAGNOSIS — Z80.3 FAMILY HISTORY OF MALIGNANT NEOPLASM OF BREAST: ICD-10-CM

## 2025-04-02 DIAGNOSIS — R92.30 DENSE BREASTS, UNSPECIFIED: ICD-10-CM

## 2025-04-02 DIAGNOSIS — D05.11 INTRADUCTAL CARCINOMA IN SITU OF RIGHT BREAST: ICD-10-CM

## 2025-04-02 DIAGNOSIS — Z91.89 OTHER SPECIFIED PERSONAL RISK FACTORS, NOT ELSEWHERE CLASSIFIED: ICD-10-CM

## 2025-04-02 PROCEDURE — 99213 OFFICE O/P EST LOW 20 MIN: CPT

## 2025-07-23 ENCOUNTER — APPOINTMENT (OUTPATIENT)
Dept: HEART AND VASCULAR | Facility: CLINIC | Age: 55
End: 2025-07-23
Payer: COMMERCIAL

## 2025-07-23 VITALS
HEART RATE: 59 BPM | SYSTOLIC BLOOD PRESSURE: 130 MMHG | HEIGHT: 63 IN | TEMPERATURE: 98 F | DIASTOLIC BLOOD PRESSURE: 66 MMHG | WEIGHT: 113 LBS | OXYGEN SATURATION: 99 % | BODY MASS INDEX: 20.02 KG/M2

## 2025-07-23 DIAGNOSIS — I35.1 NONRHEUMATIC AORTIC (VALVE) INSUFFICIENCY: ICD-10-CM

## 2025-07-23 PROCEDURE — 93000 ELECTROCARDIOGRAM COMPLETE: CPT

## 2025-07-23 PROCEDURE — 99213 OFFICE O/P EST LOW 20 MIN: CPT | Mod: 25

## 2025-07-23 PROCEDURE — 93306 TTE W/DOPPLER COMPLETE: CPT

## (undated) DEVICE — SUT PLAIN GUT FAST ABSORBING 5-0 PC-1

## (undated) DEVICE — DRSG GAUZE SPONGE 2X2" STERILE

## (undated) DEVICE — WARMING BLANKET FULL UNDERBODY

## (undated) DEVICE — SUT NYLON 11-0 4" DRM4

## (undated) DEVICE — NDL HYPO REGULAR BEVEL 25G X 1.5" (BLUE)

## (undated) DEVICE — Device

## (undated) DEVICE — ABDOMINAL BINDER MED/LG 12" X 45"-62"

## (undated) DEVICE — PACK BREAST RECONSTRUCTION

## (undated) DEVICE — VISTASEAL DUAL APPLICATOR

## (undated) DEVICE — TUBING BLUE CAP M/F

## (undated) DEVICE — SUT MONOCRYL 3-0 18" PS-2 UNDYED

## (undated) DEVICE — WARMING BLANKET LOWER ADULT

## (undated) DEVICE — SUT PDS II 0 27" CT-1

## (undated) DEVICE — GLV 7.5 PROTEXIS (WHITE)

## (undated) DEVICE — DRSG TELFA 3 X 8

## (undated) DEVICE — ELCTR BOVIE TIP BLADE MEGADYNE E-Z CLEAN 4" EXTENDED

## (undated) DEVICE — CANNULA MICROAIRE FLARED MERCEDES 4MM 30CM

## (undated) DEVICE — PREP BETADINE KIT

## (undated) DEVICE — KELLER FUNNEL

## (undated) DEVICE — SYR LUER LOK 20CC

## (undated) DEVICE — DRSG TEGADERM 4X4.75

## (undated) DEVICE — DRSG MASTISOL

## (undated) DEVICE — VENODYNE/SCD SLEEVE CALF MEDIUM

## (undated) DEVICE — SLV COMPRESSION KNEE MED

## (undated) DEVICE — WOUND IRR IRRISEPT W 0.5 CHG

## (undated) DEVICE — ADAPTER LUER LOCK TO LUER LOCK

## (undated) DEVICE — PREP BETADINE SPONGE STICKS

## (undated) DEVICE — SUT ETHILON 5-0 18" P-3

## (undated) DEVICE — BAG SPONGE COUNTER EZ

## (undated) DEVICE — DRSG 3M TEGADERM CHG CHLORHEXIDINE GLUCONATE GEL PAD

## (undated) DEVICE — ONETRAC LIGHTED RETRACTOR 90 X 22MM DISP

## (undated) DEVICE — DRAPE IOBAN 23" X 23"

## (undated) DEVICE — ONETRAC LIGHTED RETRACTOR 135 X 30MM DISP

## (undated) DEVICE — PACK LIPECTOMY

## (undated) DEVICE — SYR CONTROL LUER LOK 10CC

## (undated) DEVICE — PREP CHLORAPREP HI-LITE ORANGE 26ML

## (undated) DEVICE — DRAPE SPLIT SHEET 77" X 108"

## (undated) DEVICE — SUT MONOCRYL 4-0 18" P-3 UNDYED

## (undated) DEVICE — SYR LUER LOK 1CC

## (undated) DEVICE — SUT VICRYL 2-0 27" CT-1 UNDYED

## (undated) DEVICE — TUBING MICROAIRE ASPIRATION SET 12FT

## (undated) DEVICE — POSITIONER FOAM EGG CRATE ULNAR 2PCS (PINK)

## (undated) DEVICE — SUT PROLENE 2-0 30" CT-2

## (undated) DEVICE — BLADE SCALPEL SAFETY #11 WITH PLASTIC GREEN HANDLE

## (undated) DEVICE — SYS RESOLVE FAT PROCESSING 1 UNIT

## (undated) DEVICE — SYR LUER LOK 50CC

## (undated) DEVICE — DRAPE TOP SHEET 53" X 101"